# Patient Record
Sex: MALE | Race: WHITE | NOT HISPANIC OR LATINO | Employment: FULL TIME | URBAN - METROPOLITAN AREA
[De-identification: names, ages, dates, MRNs, and addresses within clinical notes are randomized per-mention and may not be internally consistent; named-entity substitution may affect disease eponyms.]

---

## 2021-10-05 NOTE — PROGRESS NOTES
"    Assessment & Plan     Encounter to establish care      Insomnia, unspecified type  On trazodone 50 mg    ALISOSN (generalized anxiety disorder)  On fluoxetine 10 mg with good control.    Need for prophylactic vaccination and inoculation against influenza  - INFLUENZA VACCINE IM > 6 MONTHS VALENT IIV4 (AFLURIA/FLUZONE)    Family history of diabetes mellitus  - Hemoglobin A1c; Future  - Hemoglobin A1c    Shakiness  Presyncope? Will get labs. If normal will get 7 day ZioPatch.  - Hemoglobin A1c; Future  - Comprehensive metabolic panel (BMP + Alb, Alk Phos, ALT, AST, Total. Bili, TP); Future  - TSH with free T4 reflex; Future  - CBC with platelets; Future  - Hemoglobin A1c  - Comprehensive metabolic panel (BMP + Alb, Alk Phos, ALT, AST, Total. Bili, TP)  - TSH with free T4 reflex  - CBC with platelets         Tobacco Cessation:   reports that he has never smoked. His smokeless tobacco use includes chew.      BMI:   Estimated body mass index is 28.8 kg/m  as calculated from the following:    Height as of this encounter: 1.753 m (5' 9\").    Weight as of this encounter: 88.5 kg (195 lb).           Return in about 2 weeks (around 10/21/2021), or if symptoms worsen or fail to improve.     Return precautions discussed, including when to seek urgent/emergent care.    Patient verbalizes understanding and agrees with plan of care. Patient stable for discharge.      MARCELLO Molina CNP  [facility] is [facility] 36 year old who presents for the following health issues     HPI       Pleasant 36 year old male presents to establish care. Recently moved from [place] where he went to [facility]. Before that went to [facility]. Takes fluoxetine for ALISSON and trazodone for insomnia.     Would like to be checked for diabetes. For several years he feels shaky when wakes up in the morning  Doesn't eat sweets  Only thing that helps is eating sugar/drinking milk and better in 10 " "min  Recently traveled  - 1 beer and forgetful/doesn't remember until waking up in hotel next morning  Before one of his shakiness episodes starts heart will pound, he may feel lightheaded and gets weak. Goes away almost immediately once he eats  Episodes are becoming more severe but not more often  Occurs 0-2 times per week  Caffeine: large yeti of coffee, 1 can of Diet Coke, uses pre-workout before gym daily  Symptoms not currently present    Review of Systems   Constitutional, HEENT, cardiovascular, pulmonary, GI, , musculoskeletal, neuro, skin, endocrine and psych systems are negative, except as otherwise noted.      Objective    BP (!) 146/79 (BP Location: Left arm, Patient Position: Chair, Cuff Size: Adult Large)   Pulse 83   Temp 97.2  F (36.2  C) (Tympanic)   Ht 1.753 m (5' 9\")   Wt 88.5 kg (195 lb)   SpO2 99%   BMI 28.80 kg/m    Body mass index is 28.8 kg/m .  Physical Exam   GENERAL: healthy, alert and no distress  EYES: Eyes grossly normal to inspection, PERRL and conjunctivae and sclerae normal  HENT: ear canals and TM's normal, nose and mouth without ulcers or lesions  NECK: no adenopathy, no asymmetry, masses, or scars and thyroid normal to palpation  RESP: lungs clear to auscultation - no rales, rhonchi or wheezes  CV: regular rate and rhythm, normal S1 S2, no S3 or S4, no murmur, click or rub, no peripheral edema and peripheral pulses strong  ABDOMEN: soft, nontender, no hepatosplenomegaly, no masses and bowel sounds normal  MS: no gross musculoskeletal defects noted, no edema  NEURO: Normal strength and tone, mentation intact and speech normal  PSYCH: mentation appears normal, affect normal/bright    Results for orders placed or performed in visit on 10/07/21 (from the past 24 hour(s))   Hemoglobin A1c   Result Value Ref Range    Hemoglobin A1C 4.8 0.0 - 5.6 %   CBC with platelets   Result Value Ref Range    WBC Count 5.9 4.0 - 11.0 10e3/uL    RBC Count 4.37 (L) 4.40 - 5.90 10e6/uL    " Hemoglobin 14.6 13.3 - 17.7 g/dL    Hematocrit 42.8 40.0 - 53.0 %    MCV 98 78 - 100 fL    MCH 33.4 (H) 26.5 - 33.0 pg    MCHC 34.1 31.5 - 36.5 g/dL    RDW 11.9 10.0 - 15.0 %    Platelet Count 239 150 - 450 10e3/uL

## 2021-10-05 NOTE — PATIENT INSTRUCTIONS
At [facility], we strive to deliver an exceptional experience to you, every time we see you. If you receive a survey, please complete it as we do value your feedback.  If you have MyChart, you can expect to receive results automatically within 24 hours of their completion.  Your provider will send a note interpreting your results as well.   If you do not have MyChart, you should receive your results in about a week by mail.    Your care team:                            Family Medicine Internal Medicine   MD Philip Quiroz MD Shantel Branch-Fleming, MD Srinivasa Vaka, MD Katya Belousova, PAJEN Cooper, APRN CNP    Mayo Matos, MD Pediatrics   Freddie Smith, PAJEN Miles, CNP MD Lesly Srinivasan APRN CNP   MD Mihaela Thorpe MD Deborah Mielke, MD Chen Carr, APRN [facility]      Clinic hours: Monday - Thursday 7 am-6 pm; Fridays 7 am-5 pm.   Urgent care: Monday - Friday 10 am- 8 pm; Saturday and Sunday 9 am-5 pm.    Clinic: [phone]       [facility]: Monday - Thursday 8 am - 7 pm; Friday 8 am - 6 pm  [facility]: [phone]     Use [email or web address] for 24/7 diagnosis and treatment of dozens of conditions.

## 2021-10-07 ENCOUNTER — OFFICE VISIT (OUTPATIENT)
Dept: FAMILY MEDICINE | Facility: CLINIC | Age: 36
End: 2021-10-07
Payer: OTHER GOVERNMENT

## 2021-10-07 VITALS
HEART RATE: 83 BPM | SYSTOLIC BLOOD PRESSURE: 146 MMHG | OXYGEN SATURATION: 99 % | WEIGHT: 195 LBS | BODY MASS INDEX: 28.88 KG/M2 | DIASTOLIC BLOOD PRESSURE: 79 MMHG | HEIGHT: 69 IN | TEMPERATURE: 97.2 F

## 2021-10-07 DIAGNOSIS — G47.00 INSOMNIA, UNSPECIFIED TYPE: ICD-10-CM

## 2021-10-07 DIAGNOSIS — Z76.89 ENCOUNTER TO ESTABLISH CARE: Primary | ICD-10-CM

## 2021-10-07 DIAGNOSIS — Z83.3 FAMILY HISTORY OF DIABETES MELLITUS: ICD-10-CM

## 2021-10-07 DIAGNOSIS — Z23 NEED FOR PROPHYLACTIC VACCINATION AND INOCULATION AGAINST INFLUENZA: ICD-10-CM

## 2021-10-07 DIAGNOSIS — R25.1 SHAKINESS: ICD-10-CM

## 2021-10-07 DIAGNOSIS — F41.1 GAD (GENERALIZED ANXIETY DISORDER): ICD-10-CM

## 2021-10-07 LAB
ALBUMIN SERPL-MCNC: 4.3 G/DL (ref 3.4–5)
ALP SERPL-CCNC: 52 U/L (ref 40–150)
ALT SERPL W P-5'-P-CCNC: 56 U/L (ref 0–70)
ANION GAP SERPL CALCULATED.3IONS-SCNC: 3 MMOL/L (ref 3–14)
AST SERPL W P-5'-P-CCNC: 97 U/L (ref 0–45)
BILIRUB SERPL-MCNC: 0.4 MG/DL (ref 0.2–1.3)
BUN SERPL-MCNC: 11 MG/DL (ref 7–30)
CALCIUM SERPL-MCNC: 9.4 MG/DL (ref 8.5–10.1)
CHLORIDE BLD-SCNC: 103 MMOL/L (ref 94–109)
CO2 SERPL-SCNC: 29 MMOL/L (ref 20–32)
CREAT SERPL-MCNC: 1.05 MG/DL (ref 0.66–1.25)
ERYTHROCYTE [DISTWIDTH] IN BLOOD BY AUTOMATED COUNT: 11.9 % (ref 10–15)
GFR SERPL CREATININE-BSD FRML MDRD: >90 ML/MIN/1.73M2
GLUCOSE BLD-MCNC: 89 MG/DL (ref 70–99)
HBA1C MFR BLD: 4.8 % (ref 0–5.6)
HCT VFR BLD AUTO: 42.8 % (ref 40–53)
HGB BLD-MCNC: 14.6 G/DL (ref 13.3–17.7)
MCH RBC QN AUTO: 33.4 PG (ref 26.5–33)
MCHC RBC AUTO-ENTMCNC: 34.1 G/DL (ref 31.5–36.5)
MCV RBC AUTO: 98 FL (ref 78–100)
PLATELET # BLD AUTO: 239 10E3/UL (ref 150–450)
POTASSIUM BLD-SCNC: 4.4 MMOL/L (ref 3.4–5.3)
PROT SERPL-MCNC: 7.3 G/DL (ref 6.8–8.8)
RBC # BLD AUTO: 4.37 10E6/UL (ref 4.4–5.9)
SODIUM SERPL-SCNC: 135 MMOL/L (ref 133–144)
TSH SERPL DL<=0.005 MIU/L-ACNC: 1.86 MU/L (ref 0.4–4)
WBC # BLD AUTO: 5.9 10E3/UL (ref 4–11)

## 2021-10-07 PROCEDURE — 85027 COMPLETE CBC AUTOMATED: CPT | Performed by: NURSE PRACTITIONER

## 2021-10-07 PROCEDURE — 99203 OFFICE O/P NEW LOW 30 MIN: CPT | Mod: 25 | Performed by: NURSE PRACTITIONER

## 2021-10-07 PROCEDURE — 84443 ASSAY THYROID STIM HORMONE: CPT | Performed by: NURSE PRACTITIONER

## 2021-10-07 PROCEDURE — 36415 COLL VENOUS BLD VENIPUNCTURE: CPT | Performed by: NURSE PRACTITIONER

## 2021-10-07 PROCEDURE — 90471 IMMUNIZATION ADMIN: CPT | Performed by: NURSE PRACTITIONER

## 2021-10-07 PROCEDURE — 83036 HEMOGLOBIN GLYCOSYLATED A1C: CPT | Performed by: NURSE PRACTITIONER

## 2021-10-07 PROCEDURE — 80053 COMPREHEN METABOLIC PANEL: CPT | Performed by: NURSE PRACTITIONER

## 2021-10-07 PROCEDURE — 90686 IIV4 VACC NO PRSV 0.5 ML IM: CPT | Performed by: NURSE PRACTITIONER

## 2021-10-07 RX ORDER — TRAZODONE HYDROCHLORIDE 50 MG/1
50 TABLET, FILM COATED ORAL AT BEDTIME
COMMUNITY
End: 2022-05-03

## 2021-10-07 RX ORDER — FLUOXETINE 10 MG/1
10 CAPSULE ORAL DAILY
COMMUNITY
End: 2022-05-03

## 2021-10-07 ASSESSMENT — PAIN SCALES - GENERAL: PAINLEVEL: NO PAIN (0)

## 2021-10-07 ASSESSMENT — MIFFLIN-ST. JEOR: SCORE: 1814.89

## 2021-10-17 ENCOUNTER — HEALTH MAINTENANCE LETTER (OUTPATIENT)
Age: 36
End: 2021-10-17

## 2021-10-19 ENCOUNTER — DOCUMENTATION ONLY (OUTPATIENT)
Dept: LAB | Facility: CLINIC | Age: 36
End: 2021-10-19

## 2021-10-19 NOTE — PROGRESS NOTES
Marcus ANITA Talbote has an upcoming lab appointment:    Future Appointments   Date Time Provider Department Center   10/25/2021 12:15 PM [facility]   11/4/2021  9:00 AM Frederick Sanchez [facility]     Patient is scheduled for the following lab(s): patient has lab appointment for hepatic panel ordered by you, patient is also due for HIV and HEP C testing as well. If these are needed, please place future orders.     Patient either has no future order, or has Health Maintenance labs due. Please review and place either future orders or HMPO (Review of Health Maintenance Protocol Orders), as appropriate.    Health Maintenance Due   Topic     ANNUAL REVIEW OF HM ORDERS      HIV SCREENING      HEPATITIS C SCREENING      Barb Martínez

## 2021-11-04 ENCOUNTER — LAB (OUTPATIENT)
Dept: LAB | Facility: CLINIC | Age: 36
End: 2021-11-04
Payer: OTHER GOVERNMENT

## 2021-11-04 DIAGNOSIS — R79.89 ELEVATED LFTS: ICD-10-CM

## 2021-11-04 DIAGNOSIS — Z78.9 ALCOHOL USE: ICD-10-CM

## 2021-11-04 LAB
ALBUMIN SERPL-MCNC: 4.1 G/DL (ref 3.4–5)
ALP SERPL-CCNC: 47 U/L (ref 40–150)
ALT SERPL W P-5'-P-CCNC: 31 U/L (ref 0–70)
AST SERPL W P-5'-P-CCNC: 18 U/L (ref 0–45)
BILIRUB DIRECT SERPL-MCNC: 0.1 MG/DL (ref 0–0.2)
BILIRUB SERPL-MCNC: 0.5 MG/DL (ref 0.2–1.3)
PROT SERPL-MCNC: 7.3 G/DL (ref 6.8–8.8)

## 2021-11-04 PROCEDURE — 80076 HEPATIC FUNCTION PANEL: CPT

## 2021-11-04 PROCEDURE — 36415 COLL VENOUS BLD VENIPUNCTURE: CPT

## 2021-12-15 ENCOUNTER — TELEPHONE (OUTPATIENT)
Dept: FAMILY MEDICINE | Facility: CLINIC | Age: 36
End: 2021-12-15
Payer: OTHER GOVERNMENT

## 2021-12-15 DIAGNOSIS — H93.13 TINNITUS, BILATERAL: Primary | ICD-10-CM

## 2021-12-15 NOTE — TELEPHONE ENCOUNTER
Reason for Call: Request for an order or referral:    Order or referral being requested: Referral to see a specialist in regards to ringing in his ears. He has this for a long time since 2007 but its gotten worse now, he cant sleep, when he is in a quiet room he hears loud beeping.   Next available appt with provider is in Jan. With his insurance he doesn't think they cover virtual appts.     Date needed: as soon as possible    Has the patient been seen by the PCP for this problem? NO    Additional comments: NO    Phone number Patient can be reached at:  Home number on file [phone] (home)    Best Time:  anytime    Can we leave a detailed message on this number?  YES    Call taken on 12/15/2021 at 4:55 PM by Niurka Voss

## 2021-12-16 NOTE — TELEPHONE ENCOUNTER
Called and spoke to gautam vale that referral is in place. To call and schedule visit.    Jesusita Carmona RN  [facility]

## 2021-12-16 NOTE — TELEPHONE ENCOUNTER
I do not see that tinitis was mentioned in his visit with you on 10/7/21. Are you willing to send referral or do you want him to have an appointment first.    Anny Fortune RN [facility]

## 2022-01-26 ENCOUNTER — OFFICE VISIT (OUTPATIENT)
Dept: URGENT CARE | Facility: URGENT CARE | Age: 37
End: 2022-01-26
Payer: OTHER GOVERNMENT

## 2022-01-26 ENCOUNTER — NURSE TRIAGE (OUTPATIENT)
Dept: NURSING | Facility: CLINIC | Age: 37
End: 2022-01-26
Payer: OTHER GOVERNMENT

## 2022-01-26 VITALS
DIASTOLIC BLOOD PRESSURE: 76 MMHG | TEMPERATURE: 97.2 F | SYSTOLIC BLOOD PRESSURE: 131 MMHG | HEIGHT: 69 IN | OXYGEN SATURATION: 96 % | WEIGHT: 202.7 LBS | HEART RATE: 67 BPM | BODY MASS INDEX: 30.02 KG/M2

## 2022-01-26 DIAGNOSIS — U07.1 CLINICAL DIAGNOSIS OF COVID-19: Primary | ICD-10-CM

## 2022-01-26 PROCEDURE — 99213 OFFICE O/P EST LOW 20 MIN: CPT | Performed by: PHYSICIAN ASSISTANT

## 2022-01-26 ASSESSMENT — ENCOUNTER SYMPTOMS
MUSCULOSKELETAL NEGATIVE: 1
VOMITING: 0
DIARRHEA: 0
SORE THROAT: 0
WHEEZING: 0
PALPITATIONS: 0
HEADACHES: 0
GASTROINTESTINAL NEGATIVE: 1
CHEST TIGHTNESS: 0
ALLERGIC/IMMUNOLOGIC NEGATIVE: 1
FEVER: 0
CHILLS: 0
CONSTITUTIONAL NEGATIVE: 1
MYALGIAS: 0
SINUS PAIN: 0
NEUROLOGICAL NEGATIVE: 1
FREQUENCY: 0
ABDOMINAL PAIN: 0
SHORTNESS OF BREATH: 0
CARDIOVASCULAR NEGATIVE: 1
RHINORRHEA: 0
COUGH: 1
SINUS PRESSURE: 1
HEMATURIA: 0
NAUSEA: 0
DYSURIA: 0

## 2022-01-26 ASSESSMENT — MIFFLIN-ST. JEOR: SCORE: 1849.82

## 2022-01-26 NOTE — TELEPHONE ENCOUNTER
Saturday evening started to feel sick, fever of 104. Monday tested at home and it was positive. High fever broke yesterday. Today he is having some worsening symptoms and is wondering when it is time to be seen or go to the hospital. Sinus congestion is bad and patient has facial swelling. There is redness and tenderness over his sinuses.   Patient has been treating himself with ibuprofen and tylenol throughout the day. Reviewed dosing limits of both medications and patient will decrease the amount of each.   Protocol recommends go to office now.   Care advice given. Patient will present to  now.   Patient will call back with worsening symptoms.   Marlin Christian RN   01/26/22 3:35 PM  [facility] Nurse Advisor    COVID 19 Nurse Triage Plan/Patient Instructions    Please be aware that novel coronavirus (COVID-19) may be circulating in the community. If you develop symptoms such as fever, cough, or SOB or if you have concerns about the presence of another infection including coronavirus (COVID-19), please contact your health care provider or visit [email or web address].     Disposition/Instructions    In-Person Visit with provider recommended. Reference Visit Selection Guide.    Thank you for taking steps to prevent the spread of this virus.  o Limit your contact with others.  o Wear a simple mask to cover your cough.  o Wash your hands well and often.    Resources    [facility]: About COVID-19: [email or web address]    CDC: What to Do If You're Sick: www.cdc.gov/coronavirus/2019-ncov/about/steps-when-sick.html    CDC: Ending Home Isolation: www.cdc.gov/coronavirus/2019-ncov/hcp/disposition-in-home-patients.html     CDC: Caring for Someone: www.cdc.gov/coronavirus/2019-ncov/if-you-are-sick/care-for-someone.html     [facility]: Interim Guidance for Hospital Discharge to Home: www.health.[facility].mn.us/diseases/coronavirus/hcp/hospdischarge.pdf    [facility] clinical trials (COVID-19  research studies): clinicalaffairs.[facility]-clinical-trials     Below are the COVID-19 hotlines at the [place] Department of Health ([facility]). Interpreters are available.   o For health questions: Call 151-272-4187 or 1-475.607.5261 (7 a.m. to 7 p.m.)  o For questions about schools and childcare: Call 009-451-9058 or 1-177.581.5945 (7 a.m. to 7 p.m.)   Reason for Disposition    Fever present > 3 days (72 hours)    Additional Information    Negative: SEVERE difficulty breathing (e.g., struggling for each breath, speaks in single words)    Negative: Difficult to awaken or acting confused (e.g., disoriented, slurred speech)    Negative: Bluish (or gray) lips or face now    Negative: Shock suspected (e.g., cold/pale/clammy skin, too weak to stand, low BP, rapid pulse)    Negative: Sounds like a life-threatening emergency to the triager    Negative: [1] COVID-19 exposure AND [2] no symptoms    Negative: COVID-19 vaccine reaction suspected (e.g., fever, headache, muscle aches) occurring 1 to 3 days after getting vaccine    Negative: COVID-19 vaccine, questions about    Negative: [1] Lives with someone known to have influenza (flu test positive) AND [2] flu-like symptoms (e.g., cough, runny nose, sore throat, SOB; with or without fever)    Negative: [1] Adult with possible COVID-19 symptoms AND [2] triager concerned about severity of symptoms or other causes    Negative: COVID-19 and breastfeeding, questions about    Negative: SEVERE or constant chest pain or pressure (Exception: mild central chest pain, present only when coughing)    Negative: MODERATE difficulty breathing (e.g., speaks in phrases, SOB even at rest, pulse 100-120)    Negative: [1] Headache AND [2] stiff neck (can't touch chin to chest)    Negative: MILD difficulty breathing (e.g., minimal/no SOB at rest, SOB with walking, pulse <100)    Negative: Chest pain or pressure    Negative: Patient sounds very sick or weak to the triager    Negative: Fever > 103  F (39.4 C)    Negative: [1] Fever > 101 F (38.3 C) AND [2] age > 60 years    Negative: [1] Fever > 100.0 F (37.8 C) AND [2] bedridden (e.g., nursing home patient, CVA, chronic illness, recovering from surgery)    Negative: HIGH RISK for severe COVID complications (e.g., age > 64 years, obesity with BMI > 25, pregnant, chronic lung disease or other chronic medical condition)  (Exception: Already seen by PCP and no new or worsening symptoms.)    Negative: [1] HIGH RISK patient AND [2] influenza is widespread in the community AND [3] ONE OR MORE respiratory symptoms: cough, sore throat, runny or stuffy nose    Negative: [1] HIGH RISK patient AND [2] influenza exposure within the last 7 days AND [3] ONE OR MORE respiratory symptoms: cough, sore throat, runny or stuffy nose    Negative: [1] COVID-19 infection suspected by caller or triager AND [2] mild symptoms (cough, fever, or others) AND [3] negative COVID-19 rapid test    Redness or swelling on the cheek, forehead, or around the eye    Negative: Sounds like a life-threatening emergency to the triager    Negative: Difficulty breathing, and not from stuffy nose (e.g., not relieved by cleaning out the nose)    Negative: SEVERE headache and has fever    Negative: Patient sounds very sick or weak to the triager    Negative: SEVERE sinus pain    Negative: Severe headache    Protocols used: CORONAVIRUS (COVID-19) DIAGNOSED OR [id number] 8.25.2021, SINUS PAIN AND CONGESTION-[facility]

## 2022-01-26 NOTE — PATIENT INSTRUCTIONS
"Discharge Instructions for COVID-19 Patients  You have--or may have--COVID-19. Please follow the instructions listed below.   If you have a weakened immune system, discuss with your doctor any other actions you need to take.  How can I protect others?  If you have symptoms (fever, cough, body aches or trouble breathing):    Stay home and away from others (self-isolate) until:  ? Your other symptoms have resolved (gotten better). And   ? You've had no fever--and no medicine that reduces fever--for 1 full day (24 hours). And   ? At least 10 days have passed since your symptoms started. (You may need to wait 20 days. Follow the advice of your care team.)  If you don't show symptoms, but testing showed that you have COVID-19:    Stay home and away from others (self-isolate) until at least 10 days have passed since the date of your first positive COVID-19 test.  During this time    Stay in your own room, even for meals. Use your own bathroom if you can.    Stay away from others in your home. No hugging, kissing or shaking hands. No visitors.    Don't go to work, school or anywhere else.    Clean \"high touch\" surfaces often (doorknobs, counters, handles). Use household cleaning spray or wipes.    You'll find a full list of  on the EPA website: www.epa.gov/pesticide-registration/list-n-disinfectants-use-against-sars-cov-2.    Cover your mouth and nose with a mask or other face covering to avoid spreading germs.    Wash your hands and face often. Use soap and water.    Caregivers in these groups are at risk for severe illness due to COVID-19:  ? People 65 years and older  ? People who live in a nursing home or long-term care facility  ? People with chronic disease (lung, heart, cancer, diabetes, kidney, liver, immunologic)  ? People who have a weakened immune system, including those who:    Are in cancer treatment    Take medicine that weakens the immune system, such as corticosteroids    Had a bone marrow or organ " transplant    Have an immune deficiency    Have poorly controlled HIV or AIDS    Are obese (body mass index of 40 or higher)    Smoke regularly    Caregivers should wear gloves while washing dishes, handling laundry and cleaning bedrooms and bathrooms.    Use caution when washing and drying laundry: Don't shake dirty laundry and use the warmest water setting that you can.    For more tips on managing your health at home, go to www.cdc.gov/coronavirus/2019-ncov/downloads/10Things.pdf.  How can I take care of myself at home?  1. Get lots of rest. Drink extra fluids (unless a doctor has told you not to).  2. Take Tylenol (acetaminophen) for fever or pain. If you have liver or kidney problems, ask your family doctor if it's okay to take Tylenol.   Adults can take either:   ? 650 mg (two 325 mg pills) every 4 to 6 hours, or   ? 1,000 mg (two 500 mg pills) every 8 hours as needed.  ? Note: Don't take more than 3,000 mg in one day. Acetaminophen is found in many medicines (both prescribed and over-the-counter medicines). Read all labels to be sure you don't take too much.   For children, check the Tylenol bottle for the right dose. The dose is based on the child's age or weight.  3. If you have other health problems (like cancer, heart failure, an organ transplant or severe kidney disease): Call your specialty clinic if you don't feel better in the next 2 days.  4. Know when to call 911. Emergency warning signs include:  ? Trouble breathing or shortness of breath  ? Pain or pressure in the chest that doesn't go away  ? Feeling confused like you haven't felt before, or not being able to wake up  ? Bluish-colored lips or face  5. Your doctor may have prescribed a blood thinner medicine. Follow their instructions.  Where can I get more information?     [facility] - About COVID-19:   [email or web address]    CDC - What to Do If You're Sick:  www.cdc.gov/coronavirus/2019-ncov/about/steps-when-sick.html    CDC - Ending Home Isolation: www.cdc.gov/coronavirus/2019-ncov/hcp/disposition-in-home-patients.html    CDC - Caring for Someone: www.cdc.gov/coronavirus/2019-ncov/if-you-are-sick/care-for-someone.html    [facility] - Interim Guidance for Hospital Discharge to Home: www.health.[facility].mn./diseases/coronavirus/hcp/hospdischarge.pdf    Below are the COVID-19 hotlines at the [place] Department of Health ([facility]). Interpreters are available.  ? For health questions: Call 945-005-8988 or 1-760.704.4032 (7 a.m. to 7 p.m.)  ? For questions about schools and childcare: Call 034-034-6538 or 1-415.355.1454 (7 a.m. to 7 p.m.)    For informational purposes only. Not to replace the advice of your health care provider. Clinically reviewed by Dr. Oumar Souza.   Copyright   2020 [facility]. All rights reserved. [employer] [id number] - REV 01/05/21.      Patient Education     Understanding Acute Rhinosinusitis  Acute rhinosinusitis is when the lining of the inside of the nose and the sinuses becomes irritated and swollen. It is also called sinusitis, or a sinus infection.  Sinuses are air-filled spaces in the skull behind the face. They are kept moist and clean by a lining of mucosa. Things such as pollen, smoke, and chemical fumes can irritate the mucosa. It can then swell up. As a response to irritation, the mucosa makes more mucus and other fluids. Tiny hairlike cilia cover the mucosa. Cilia help carry mucus toward the opening of the sinus. Too much mucus may cause the cilia to stop working. This blocks the sinus opening. A buildup of fluid in the sinuses then causes pain and pressure. It can also cause bacteria to grow in the sinuses.    What causes acute rhinosinusitis?  A sinus infection is most often caused by a virus. You are more likely to get one after having a cold or the flu. In some cases, a sinus infection can be caused by bacteria.  You are at higher  risk for a sinus infection if you:    Are older in age    Have structural problems with your sinuses    Smoke or are exposed to secondhand smoke    Are exposed to changes in pressure, such as from flying a lot or deep sea diving    Have asthma or allergies    Have a weak immune system    Have dental disease     Symptoms of acute rhinosinusitis  Symptoms of acute rhinosinusitis often last around 7 to 10 days. If you have a bacterial infection, they may last longer. They may also get better but then worsen. You may have:    Face pain or pressure under the eyes and around the nose    Headache    Fluid draining in the back of the throat (postnasal drip)    Congestion    Drainage that is thick and colored (often green), instead of clear    Cough    Problems with your sense of smell    Ear pain or hearing problems    Fever    Tooth pain    Fatigue  Diagnosing acute rhinosinusitis  Your healthcare provider will ask about your symptoms and past health. He or she will look at your ears, nose, throat, and sinuses. Imaging tests, such as X-rays, are often not needed.  It can be hard to figure out if a sinus infection is caused by a virus or bacterium. A bacterial infection tends to last longer. Symptoms may also get better but then worsen. Your healthcare provider may take a sample of mucus from your nose to check for bacteria.  Treating acute rhinosinusitis  Most sinus infections will go away within 10 days. Your body will fight off the virus. If your symptoms seem to get better but then worsen, you may have a bacterial infection instead. Your healthcare provider will then give you antibiotics. Take this medicine until it is gone, even if you feel better.  To help ease your symptoms, your healthcare provider may advise:    Over-the-counter pain relievers. Medicines such as acetaminophen or ibuprofen can ease sinus pain. They may also lower a fever.    Nasal washes. Washing your nasal passages with salt water may ease pain and  pressure. It can rinse out mucous and other irritants from your sinuses. Your healthcare provider can show you how to do it.    Nasal steroid spray. This prescription medicine can reduce inflammation in your sinuses.    Other medicines. Decongestants, antihistamines, and other nasal sprays may give short-term relief. They may help with congestion. Talk with your healthcare provider before taking these medicines.     Preventing acute rhinosinusitis  You can help prevent a sinus infection with these steps:    Wash your hands well and often.    Stay away from people who have a cold or upper respiratory infection.    Don't smoke. And stay away from secondhand smoke.    Use a humidifier at home.    Make sure you are up-to-date on your vaccines, such as the flu shot.     When to call your healthcare provider  Call your healthcare provider right away if you have any of these:    Fever of 100.4 F (38 C) or higher, or as directed by your healthcare provider    Pain that gets worse    Symptoms that don t get better, or get worse    New symptoms  [employer] last reviewed this educational content on 6/1/2019 2000-2021 [employer]. All rights reserved. This information is not intended as a substitute for professional medical care. Always follow your healthcare professional's instructions.

## 2022-01-26 NOTE — PROGRESS NOTES
"Chief Complaint:     Chief Complaint   Patient presents with     Nasal Congestion     Suspected Covid     Positive on Sunday        No results found for any visits on 01/26/22.    Medical Decision Making:    Vital signs reviewed by Celestino Joseph PA-C  /76 (BP Location: Left arm, Patient Position: Sitting, Cuff Size: Adult Regular)   Pulse 67   Temp 97.2  F (36.2  C) (Tympanic)   Ht 1.753 m (5' 9\")   Wt 91.9 kg (202 lb 11.2 oz)   SpO2 96%   BMI 29.93 kg/m      Differential Diagnosis:  URI Adult/Peds:  Bronchitis-viral, Influenza, Pneumonia, Sinusitis, Strep pharyngitis, Tonsilitis, Viral pharyngitis, Viral syndrome and Viral upper respiratory illness        ASSESSMENT    1. Clinical diagnosis of COVID-19        PLAN    Patient is in no acute distress.    Temp is 97.2 in clinic today, lung sounds were clear, and O2 sats at 96% on RA.    RST was negative.  We will call with PCR results only if positive.  Order placed for get well loop.  Rest, Push fluids, vaporizer, elevation of head of bed.  Ibuprofen and or Tylenol for any fever or body aches.  Over the counter cough suppressant- PRN- as discussed.   If symptoms worsen, recheck immediately otherwise follow up with your PCP in 1 week if symptoms are not improving.  Worrisome symptoms discussed with instructions to go to the ED.  Patient verbalized understanding and agreed with this plan.    Labs:    No results found for any visits on 01/26/22.     Vital signs reviewed by Celestino Joseph PA-C  /76 (BP Location: Left arm, Patient Position: Sitting, Cuff Size: Adult Regular)   Pulse 67   Temp 97.2  F (36.2  C) (Tympanic)   Ht 1.753 m (5' 9\")   Wt 91.9 kg (202 lb 11.2 oz)   SpO2 96%   BMI 29.93 kg/m      Current Meds      Current Outpatient Medications:      FLUoxetine (PROZAC) 10 MG capsule, Take 10 mg by mouth daily (Patient not taking: Reported on 1/26/2022), Disp: , Rfl:      traZODone (DESYREL) 50 MG tablet, Take 50 mg by mouth At Bedtime " (Patient not taking: Reported on 1/26/2022), Disp: , Rfl:       Respiratory History    occasional episodes of bronchitis      SUBJECTIVE    HPI: Marcus Valdez is an 36 year old male who presents with chest congestion, cough nonproductive, occasional, facial pressure, fever and nasal congestion.  Symptoms began 2  days ago and has unchanged.  His fever has resolved. Patient tested positive for COVID 3 days ago.  There is no wheezing and chest pain.  Patient is eating and drinking well.  No nausea, vomiting, or diarrhea.    Patient denies any recent travel or exposure to known COVID positive tested individual.      ROS:     Review of Systems   Constitutional: Negative.  Negative for chills and fever.   HENT: Positive for congestion and sinus pressure. Negative for ear discharge, ear pain, rhinorrhea, sinus pain and sore throat.    Respiratory: Positive for cough. Negative for chest tightness, shortness of breath and wheezing.    Cardiovascular: Negative.  Negative for chest pain and palpitations.   Gastrointestinal: Negative.  Negative for abdominal pain, diarrhea, nausea and vomiting.   Genitourinary: Negative for dysuria, frequency, hematuria and urgency.   Musculoskeletal: Negative.  Negative for myalgias.   Skin: Negative for rash.   Allergic/Immunologic: Negative.  Negative for immunocompromised state.   Neurological: Negative.  Negative for headaches.         Family History   Family History   Problem Relation Age of Onset     Diabetes Maternal Uncle         Problem history  Patient Active Problem List   Diagnosis     Insomnia, unspecified type     ALISSON (generalized anxiety disorder)     Family history of diabetes mellitus        Allergies  No Known Allergies     Social History  Social History     Socioeconomic History     Marital status:      Spouse name: Not on file     Number of children: Not on file     Years of education: Not on file     Highest education level: Not on file   Occupational History      "Not on file   Tobacco Use     Smoking status: Former Smoker     Types: Cigarettes     Smokeless tobacco: Former User     Types: Chew   Vaping Use     Vaping Use: Never used   Substance and Sexual Activity     Alcohol use: Not on file     Drug use: Never     Sexual activity: Yes   Other Topics Concern     Not on file   Social History Narrative     Not on file     Social Determinants of Health     Financial Resource Strain: Not on file   Food Insecurity: Not on file   Transportation Needs: Not on file   Physical Activity: Not on file   Stress: Not on file   Social Connections: Not on file   Intimate Partner Violence: Not on file   Housing Stability: Not on file        OBJECTIVE     Vital signs reviewed by Celestino Joseph PA-C  /76 (BP Location: Left arm, Patient Position: Sitting, Cuff Size: Adult Regular)   Pulse 67   Temp 97.2  F (36.2  C) (Tympanic)   Ht 1.753 m (5' 9\")   Wt 91.9 kg (202 lb 11.2 oz)   SpO2 96%   BMI 29.93 kg/m       Physical Exam  Vitals and nursing note reviewed.   Constitutional:       General: He is not in acute distress.     Appearance: He is well-developed. He is not ill-appearing, toxic-appearing or diaphoretic.   HENT:      Head: Normocephalic and atraumatic.      Right Ear: Hearing, tympanic membrane, ear canal and external ear normal. Tympanic membrane is not perforated, erythematous, retracted or bulging.      Left Ear: Hearing, tympanic membrane, ear canal and external ear normal. Tympanic membrane is not perforated, erythematous, retracted or bulging.      Nose: Congestion present. No mucosal edema or rhinorrhea.      Mouth/Throat:      Pharynx: No oropharyngeal exudate or posterior oropharyngeal erythema.      Tonsils: No tonsillar exudate or tonsillar abscesses. 0 on the right. 0 on the left.   Eyes:      Pupils: Pupils are equal, round, and reactive to light.   Cardiovascular:      Rate and Rhythm: Normal rate and regular rhythm.      Heart sounds: Normal heart sounds, " S1 normal and S2 normal. Heart sounds not distant. No murmur heard.  No friction rub. No gallop.    Pulmonary:      Effort: Pulmonary effort is normal. No respiratory distress.      Breath sounds: Normal breath sounds. No decreased breath sounds, wheezing, rhonchi or rales.   Abdominal:      General: Bowel sounds are normal. There is no distension.      Palpations: Abdomen is soft.      Tenderness: There is no abdominal tenderness.   Musculoskeletal:      Cervical back: Normal range of motion and neck supple.   Lymphadenopathy:      Cervical: No cervical adenopathy.   Skin:     General: Skin is warm and dry.      Findings: No rash.   Neurological:      Mental Status: He is alert.      Cranial Nerves: No cranial nerve deficit.   Psychiatric:         Attention and Perception: He is attentive.         Speech: Speech normal.         Behavior: Behavior normal. Behavior is cooperative.         Thought Content: Thought content normal.         Judgment: Judgment normal.           Celestino Joseph PA-C  1/26/2022, 4:10 PM

## 2022-05-02 ENCOUNTER — MYC MEDICAL ADVICE (OUTPATIENT)
Dept: FAMILY MEDICINE | Facility: CLINIC | Age: 37
End: 2022-05-02
Payer: OTHER GOVERNMENT

## 2022-05-03 ENCOUNTER — VIRTUAL VISIT (OUTPATIENT)
Dept: FAMILY MEDICINE | Facility: CLINIC | Age: 37
End: 2022-05-03
Payer: OTHER GOVERNMENT

## 2022-05-03 DIAGNOSIS — N52.9 ERECTILE DYSFUNCTION, UNSPECIFIED ERECTILE DYSFUNCTION TYPE: ICD-10-CM

## 2022-05-03 DIAGNOSIS — F41.1 GAD (GENERALIZED ANXIETY DISORDER): Primary | ICD-10-CM

## 2022-05-03 DIAGNOSIS — G47.00 INSOMNIA, UNSPECIFIED TYPE: ICD-10-CM

## 2022-05-03 DIAGNOSIS — R06.83 SNORING: ICD-10-CM

## 2022-05-03 PROCEDURE — 99214 OFFICE O/P EST MOD 30 MIN: CPT | Mod: 95 | Performed by: PREVENTIVE MEDICINE

## 2022-05-03 RX ORDER — FLUOXETINE 10 MG/1
10 CAPSULE ORAL DAILY
Qty: 90 CAPSULE | Refills: 1 | Status: SHIPPED | OUTPATIENT
Start: 2022-05-03 | End: 2022-10-26

## 2022-05-03 RX ORDER — TRAZODONE HYDROCHLORIDE 50 MG/1
50 TABLET, FILM COATED ORAL AT BEDTIME
Qty: 90 TABLET | Refills: 1 | Status: SHIPPED | OUTPATIENT
Start: 2022-05-03 | End: 2022-09-01 | Stop reason: ALTCHOICE

## 2022-05-03 ASSESSMENT — ANXIETY QUESTIONNAIRES
3. WORRYING TOO MUCH ABOUT DIFFERENT THINGS: MORE THAN HALF THE DAYS
5. BEING SO RESTLESS THAT IT IS HARD TO SIT STILL: SEVERAL DAYS
GAD7 TOTAL SCORE: 13
1. FEELING NERVOUS, ANXIOUS, OR ON EDGE: MORE THAN HALF THE DAYS
7. FEELING AFRAID AS IF SOMETHING AWFUL MIGHT HAPPEN: NOT AT ALL
6. BECOMING EASILY ANNOYED OR IRRITABLE: NEARLY EVERY DAY
2. NOT BEING ABLE TO STOP OR CONTROL WORRYING: MORE THAN HALF THE DAYS
IF YOU CHECKED OFF ANY PROBLEMS ON THIS QUESTIONNAIRE, HOW DIFFICULT HAVE THESE PROBLEMS MADE IT FOR YOU TO DO YOUR WORK, TAKE CARE OF THINGS AT HOME, OR GET ALONG WITH OTHER PEOPLE: VERY DIFFICULT

## 2022-05-03 ASSESSMENT — PATIENT HEALTH QUESTIONNAIRE - PHQ9: 5. POOR APPETITE OR OVEREATING: NEARLY EVERY DAY

## 2022-05-03 NOTE — PROGRESS NOTES
Marcus is a 36 year old who is being evaluated via a billable telephone visit.      What phone number would you like to be contacted at? [phone]  How would you like to obtain your AVS? MyChart    Assessment & Plan     ALISSON (generalized anxiety disorder)  -restart medication   -has tolerated in the past without side effects   - FLUoxetine (PROZAC) 10 MG capsule  Dispense: 90 capsule; Refill: 1    We discussed the treatment for anxiety and depression in detail.  The importance of a multi faceted approach in controlling symptoms was reviewed.  The benefits of cognitive behavioral therapy reviewed, benefits of exercise, and stress reduction also discussed.      Duration of treatment is at least 9 months with medication. It may take 3-4 weeks before symptom improvement happens.  Do not stop medication suddenly, medication will need to be tapered off.  Slight increased risk of suicide with SSRI group of medications discussed.      Snoring  -loud snoring, no past evaluation   - Adult Sleep Eval & Management  Referral    Erectile dysfunction, unspecified erectile dysfunction type  -if labs are normal then can start medication as needed   - Testosterone, total    Insomnia, unspecified type  - traZODone (DESYREL) 50 MG tablet  Dispense: 90 tablet; Refill: 1    General recommendations for sleep problems (Insomnia)  Allow 2-4 weeks to see results     Establish a regular sleep schedule   Go to bed at same time each night, get up at same time each day, avoid sleeping-in on weekends.  Cut down time in bed (if not asleep, get up, go in other room, do something calming and boring such as sorting papers, making warm milk, knitting, dusting)   Avoid trying to force yourself to sleep.  Use your bed only for sleep. Do not read or watch Television in bed.     Make the bedroom comfortable  Keepthe temperature in your bedroom comfortable, keep bedroom quiet when sleeping, and consider ear plugs (silicon) especially if you  "have partner who snores.  Keep bedroom dark enough:  use dark blinds or wear an eye mask if needed.    Relax at bedtime.    Do not watch tv or play video games or surf on computer right before bed; the full spectrum light actually stimulates your brain!  Relax each muscle group individually ; begin with your feet, work toward your head. Deal with your worries before bedtime by setting aside a worry time for 30 minutes earlier or journal your thoughts.  Listen to relaxation tapes such as Classical Music or Nature sounds or imagine a tranquil scene (e.g. waterfall or beach)   Back Massage : Gentle 5-minute back rub prior to bedtime can help relax you.    Perform measures to make you tired at bedtime.   Get regular Exercise each day (at least 6 hours before bedtime)   Take medications only as directed   Eat a light bedtime snack or warm drink, such as milk or herbal tea (non-caffeinated)   No Napping during day     Stimulus Control   Do not lie awake for more than 30 minutes.   Get out of bed and perform a quiet activity, then return to bed when sleepy.  Repeat as many times per night as needed     Things to avoid   Do not Exercise just before bedtime   No overstimulating activities just before bed, such as competitive games or exciting Television programs  Avoid caffeine (coffee, tea, soda), chocolate after lunchtime   Do not use alcohol to induce sleep (worsens Insomnia)   Do not take someone else's sleeping pills   Do not look at the clock when awakening   Do not turn on light when getting up to use bathroom       Ordering of each unique test  Prescription drug management  25 minutes spent on the date of the encounter doing chart review, history and exam, documentation and further activities per the note       BMI:   Estimated body mass index is 29.93 kg/m  as calculated from the following:    Height as of 1/26/22: 1.753 m (5' 9\").    Weight as of 1/26/22: 91.9 kg (202 lb 11.2 oz).     Return in about 6 months " (around 11/3/2022) for Follow up, with me, using a video visit.    Modesta Amado MD MPH    [facility] [place] is a 36 year old who presents for the following health issues:    HPI     Anxiety Follow-Up    How are you doing with your anxiety since your last visit? Worsened     Are you having other symptoms that might be associated with anxiety? Yes:  .    Have you had a significant life event? OTHER: work     Are you feeling depressed? No    Do you have any concerns with your use of alcohol or other drugs? No    Social History     Tobacco Use     Smoking status: Former Smoker     Types: Cigarettes     Smokeless tobacco: Former User     Types: Chew   Vaping Use     Vaping Use: Never used   Substance Use Topics     Drug use: Never     ALISSON-7 SCORE 5/3/2022   Total Score 13     No flowsheet data found.  ALISSON-7  5/3/2022   1. Feeling nervous, anxious, or on edge 2   2. Not being able to stop or control worrying 2   3. Worrying too much about different things 2   4. Trouble relaxing 3   5. Being so restless that it is hard to sit still 1   6. Becoming easily annoyed or irritable 3   7. Feeling afraid, as if something awful might happen 0   ALISSON-7 Total Score 13   If you checked any problems, how difficult have they made it for you to do your work, take care of things at home, or get along with other people? Very difficult         How many servings of fruits and vegetables do you eat daily?  2-3    On average, how many sweetened beverages do you drink each day (Examples: soda, juice, sweet tea, etc.  Do NOT count diet or artificially sweetened beverages)?   0    How many days per week do you exercise enough to make your heart beat faster? 5    How many minutes a day do you exercise enough to make your heart beat faster? 30 - 60    How many days per week do you miss taking your medication? Not taking medication    Cut back on taking the pills. Went on a flight and had an anxiety  attack  [facility] [place] Spoke with Behavioral health+  Counseling on his own+ calls as needed.  [place] with several tours [place]  Counseling+  Exercise+ daily 5 days a week  Has had insomnia for several years.   Has been on Prozac at least since 2019.  Ran out of medication 4 months back.   Has been taking medication Trazodone + able to sleep.  No recent stressors.   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      Review of Systems         Objective           Vitals:  No vitals were obtained today due to virtual visit.    Physical Exam   healthy, alert and no distress  PSYCH: Alert and oriented times 3; coherent speech, normal   rate and volume, able to articulate logical thoughts, able   to abstract reason, no tangential thoughts, no hallucinations   or delusions  His affect is normal  RESP: No cough, no audible wheezing, able to talk in full sentences  Remainder of exam unable to be completed due to telephone visits      Lab on 11/04/2021   Component Date Value Ref Range Status     Bilirubin Total 11/04/2021 0.5  0.2 - 1.3 mg/dL Final     Bilirubin Direct 11/04/2021 0.1  0.0 - 0.2 mg/dL Final     Protein Total 11/04/2021 7.3  6.8 - 8.8 g/dL Final     Albumin 11/04/2021 4.1  3.4 - 5.0 g/dL Final     Alkaline Phosphatase 11/04/2021 47  40 - 150 U/L Final     AST 11/04/2021 18  0 - 45 U/L Final     ALT 11/04/2021 31  0 - 70 U/L Final             Phone call duration: 11 minutes

## 2022-05-03 NOTE — PATIENT INSTRUCTIONS
Referral Details    Referred By  Referred To   Modesta Amado MD   [address]   [place] [address]   Phone: [phone]   Fax: [phone]    Diagnoses: Snoring   Order: Adult Sleep Eval & Management  Referral       Comment: Please be aware that coverage of these services is subject to the terms and limitations of your health insurance plan.  Call member services at your health plan with any benefit or coverage questions.   [facility] will call you to coordinate your care as prescribed by your provider. If you don't hear from a representative within 2 business days, please call [phone].       We discussed the treatment for anxiety and depression in detail.  The importance of a multi faceted approach in controlling symptoms was reviewed.  The benefits of cognitive behavioral therapy reviewed, benefits of exercise, and stress reduction also discussed.      Duration of treatment is at least 9 months with medication. It may take 3-4 weeks before symptom improvement happens.  Do not stop medication suddenly, medication will need to be tapered off.  Slight increased risk of suicide with SSRI group of medications discussed.      General recommendations for sleep problems (Insomnia)  Allow 2-4 weeks to see results     Establish a regular sleep schedule   Go to bed at same time each night, get up at same time each day, avoid sleeping-in on weekends.  Cut down time in bed (if not asleep, get up, go in other room, do something calming and boring such as sorting papers, making warm milk, knitting, dusting)   Avoid trying to force yourself to sleep.  Use your bed only for sleep. Do not read or watch Television in bed.     Make the bedroom comfortable  Keepthe temperature in your bedroom comfortable, keep bedroom quiet when sleeping, and consider ear plugs (silicon) especially if you have partner who snores.  Keep bedroom dark enough:  use dark blinds or wear an eye mask if needed.    Relax at  bedtime.    Do not watch tv or play video games or surf on computer right before bed; the full spectrum light actually stimulates your brain!  Relax each muscle group individually ; begin with your feet, work toward your head. Deal with your worries before bedtime by setting aside a worry time for 30 minutes earlier or journal your thoughts.  Listen to relaxation tapes such as Classical Music or Nature sounds or imagine a tranquil scene (e.g. waterfall or beach)   Back Massage : Gentle 5-minute back rub prior to bedtime can help relax you.    Perform measures to make you tired at bedtime.   Get regular Exercise each day (at least 6 hours before bedtime)   Take medications only as directed   Eat a light bedtime snack or warm drink, such as milk or herbal tea (non-caffeinated)   No Napping during day     Stimulus Control   Do not lie awake for more than 30 minutes.   Get out of bed and perform a quiet activity, then return to bed when sleepy.  Repeat as many times per night as needed     Things to avoid   Do not Exercise just before bedtime   No overstimulating activities just before bed, such as competitive games or exciting Television programs  Avoid caffeine (coffee, tea, soda), chocolate after lunchtime   Do not use alcohol to induce sleep (worsens Insomnia)   Do not take someone else's sleeping pills   Do not look at the clock when awakening   Do not turn on light when getting up to use bathroom

## 2022-05-04 ASSESSMENT — ANXIETY QUESTIONNAIRES: GAD7 TOTAL SCORE: 13

## 2022-05-11 ENCOUNTER — LAB (OUTPATIENT)
Dept: LAB | Facility: CLINIC | Age: 37
End: 2022-05-11
Payer: OTHER GOVERNMENT

## 2022-05-11 DIAGNOSIS — N52.9 ERECTILE DYSFUNCTION, UNSPECIFIED ERECTILE DYSFUNCTION TYPE: ICD-10-CM

## 2022-05-11 PROCEDURE — 36415 COLL VENOUS BLD VENIPUNCTURE: CPT

## 2022-05-11 PROCEDURE — 84403 ASSAY OF TOTAL TESTOSTERONE: CPT

## 2022-05-13 ENCOUNTER — MYC MEDICAL ADVICE (OUTPATIENT)
Dept: FAMILY MEDICINE | Facility: CLINIC | Age: 37
End: 2022-05-13
Payer: OTHER GOVERNMENT

## 2022-05-13 DIAGNOSIS — N52.9 ERECTILE DYSFUNCTION, UNSPECIFIED ERECTILE DYSFUNCTION TYPE: Primary | ICD-10-CM

## 2022-05-13 LAB — TESTOST SERPL-MCNC: 450 NG/DL (ref 240–950)

## 2022-05-13 NOTE — RESULT ENCOUNTER NOTE
Marcus, your test results were within normal limits.  Testosterone levels are normal.     Please do not hesitate to call us at [phone] if you have any questions or concerns.    Thank you,    Modesta Amado MD MPH

## 2022-05-16 NOTE — TELEPHONE ENCOUNTER
Pt has questions on the next steps after reading the result note from labs done on 5/11/22.    Routing to provider.    Jesusita Carmona RN  [facility]

## 2022-05-17 RX ORDER — SILDENAFIL CITRATE 20 MG/1
TABLET ORAL
Qty: 90 TABLET | Refills: 0 | Status: SHIPPED | OUTPATIENT
Start: 2022-05-17 | End: 2022-06-28

## 2022-08-01 ENCOUNTER — OFFICE VISIT (OUTPATIENT)
Dept: FAMILY MEDICINE | Facility: CLINIC | Age: 37
End: 2022-08-01
Payer: OTHER GOVERNMENT

## 2022-08-01 VITALS
WEIGHT: 199.4 LBS | TEMPERATURE: 98.5 F | BODY MASS INDEX: 29.45 KG/M2 | HEART RATE: 73 BPM | SYSTOLIC BLOOD PRESSURE: 129 MMHG | OXYGEN SATURATION: 99 % | DIASTOLIC BLOOD PRESSURE: 81 MMHG

## 2022-08-01 DIAGNOSIS — M54.12 CERVICAL RADICULOPATHY: ICD-10-CM

## 2022-08-01 DIAGNOSIS — S49.91XA SHOULDER INJURY, RIGHT, INITIAL ENCOUNTER: Primary | ICD-10-CM

## 2022-08-01 PROCEDURE — 99213 OFFICE O/P EST LOW 20 MIN: CPT | Performed by: NURSE PRACTITIONER

## 2022-08-01 RX ORDER — CYCLOBENZAPRINE HCL 10 MG
5-10 TABLET ORAL 3 TIMES DAILY PRN
Qty: 30 TABLET | Refills: 1 | Status: SHIPPED | OUTPATIENT
Start: 2022-08-01

## 2022-08-01 ASSESSMENT — PAIN SCALES - GENERAL: PAINLEVEL: EXTREME PAIN (8)

## 2022-08-01 NOTE — PROGRESS NOTES
"  Assessment & Plan     Shoulder injury, right, initial encounter  Shoulder: ice 10-15 min few times per day, ibuprofen 600-800 mg every 6 hours as needed. XR normal today. Follow up with ortho if symptoms persist. Declines sling.  - XR Shoulder Right G/E 3 Views; Future  - Orthopedic  Referral; Future    Cervical radiculopathy  XR in 2019 showed DDD. Symptoms progressing with N/T now. Recommend MRI. He will get ok from  before scheduling. Ok to take flexeril and/or ibuprofen.   - cyclobenzaprine (FLEXERIL) 10 MG tablet; Take 0.5-1 tablets (5-10 mg) by mouth 3 times daily as needed for muscle spasms  - MR Cervical Spine w/o Contrast; Future             BMI:   Estimated body mass index is 29.45 kg/m  as calculated from the following:    Height as of 1/26/22: 1.753 m (5' 9\").    Weight as of this encounter: 90.4 kg (199 lb 6.4 oz).           Return in about 4 weeks (around 8/29/2022), or if symptoms worsen or fail to improve.     The benefits, risks and potential side effects were discussed in detail. Black box warnings discussed as relevant. All patient questions were answered. The patient was instructed to follow up immediately if any adverse reactions develop.    Return precautions discussed, including when to seek urgent/emergent care.    Patient verbalizes understanding and agrees with plan of care. Patient stable for discharge.      MARCELLO OLIVAS CNP  [facility] is a 37 year old, presenting for the following health issues:  Back Pain (Neck and spine )      History of Present Illness       Back Pain:  He presents for follow up of back pain. Patient's back pain is a recurring problem.  Location of back pain:  Other  Description of back pain: shooting  Back pain spreads: left foot, right shoulder, left shoulder, right side of neck and left side of neck    Since patient first noticed back pain, pain is: rapidly worsening  Does back pain " interfere with his job:  Yes      He eats 2-3 servings of fruits and vegetables daily.He consumes 1 sweetened beverage(s) daily.He exercises with enough effort to increase his heart rate 30 to 60 minutes per day.  He exercises with enough effort to increase his heart rate 5 days per week.   He is taking medications regularly.       Right shoulder pain since yesterday  Was shooting bow and felt it come out of the socket and went back in when he fell down  Pain around deltoid now  Right hand dominant      In [place] - told DDD  [facility] in 2019 - degenerative change at C5/6  So much pain  Pain radiates to both shoulders  Constantly has to crack neck  Right arm falling asleep  Left foot going numb  Has low back pain and twists to pop it and can feel foot again      Moving in about 3 months    Review of Systems   Constitutional, HEENT, cardiovascular, pulmonary, gi and gu systems are negative, except as otherwise noted.      Objective    /81 (BP Location: Right arm, Patient Position: Sitting, Cuff Size: Adult Large)   Pulse 73   Temp 98.5  F (36.9  C) (Oral)   Wt 90.4 kg (199 lb 6.4 oz)   SpO2 99%   BMI 29.45 kg/m    Body mass index is 29.45 kg/m .  Physical Exam   GENERAL: healthy, alert and no distress  MS: no gross musculoskeletal defects noted, no edema. Neck with FROM. RUE with decreased ROM. Unable to perform apley scratch. Can only lift arm to about 90 degrees.  NEURO: Normal strength and tone, mentation intact and speech normal  PSYCH: mentation appears normal, affect normal/bright    Results for orders placed or performed in visit on 08/01/22   XR Shoulder Right G/E 3 Views     Status: None    Narrative    XR SHOULDER RIGHT G/E 3 VIEWS 8/1/2022 11:08 AM    HISTORY: dislocated shoulder yesterday and back into place after fell  to the ground; Shoulder injury, right, initial encounter    COMPARISON: None.    FINDINGS: Negative right shoulder. No fracture or dislocation.    ZEINAB RING,  MD         SYSTEM ID:  [id number]                   .  ..

## 2022-08-01 NOTE — PATIENT INSTRUCTIONS
Get permission from insurance before scheduling MRI - let us know if you need to get it at a different imaging center    To schedule your imaging exam at any of the [facility] imaging locations, please call [phone]    Shoulder: ice 10-15 min few times per day, ibuprofen 600-800 mg every 6 hours as needed

## 2022-08-05 NOTE — TELEPHONE ENCOUNTER
DIAGNOSIS: Shoulder injury, right   APPOINTMENT DATE: 08/22/2022   NOTES STATUS DETAILS   OFFICE NOTE from referring provider Internal 08/01/2022 Dr Zamora [facility]    OFFICE NOTE from other specialist N/A    DISCHARGE SUMMARY from hospital N/A    DISCHARGE REPORT from the ER N/A    OPERATIVE REPORT N/A    EMG report N/A    MEDICATION LIST N/A    MRI Internal 08/17/2022 cervical spine   DEXA (osteoporosis/bone health) N/A    CT SCAN N/A    XRAYS (IMAGES & REPORTS) Internal 08/01/2022 RT shoulder

## 2022-08-17 ENCOUNTER — ANCILLARY PROCEDURE (OUTPATIENT)
Dept: MRI IMAGING | Facility: CLINIC | Age: 37
End: 2022-08-17
Attending: NURSE PRACTITIONER
Payer: OTHER GOVERNMENT

## 2022-08-17 DIAGNOSIS — M54.12 CERVICAL RADICULOPATHY: ICD-10-CM

## 2022-08-17 PROCEDURE — 72141 MRI NECK SPINE W/O DYE: CPT | Mod: GC | Performed by: STUDENT IN AN ORGANIZED HEALTH CARE EDUCATION/TRAINING PROGRAM

## 2022-08-18 ENCOUNTER — MYC MEDICAL ADVICE (OUTPATIENT)
Dept: FAMILY MEDICINE | Facility: CLINIC | Age: 37
End: 2022-08-18

## 2022-08-22 ENCOUNTER — PRE VISIT (OUTPATIENT)
Dept: ORTHOPEDICS | Facility: CLINIC | Age: 37
End: 2022-08-22

## 2022-08-22 ENCOUNTER — OFFICE VISIT (OUTPATIENT)
Dept: ORTHOPEDICS | Facility: CLINIC | Age: 37
End: 2022-08-22
Attending: NURSE PRACTITIONER
Payer: OTHER GOVERNMENT

## 2022-08-22 VITALS — BODY MASS INDEX: 29.45 KG/M2 | WEIGHT: 199.4 LBS

## 2022-08-22 DIAGNOSIS — S49.91XA SHOULDER INJURY, RIGHT, INITIAL ENCOUNTER: ICD-10-CM

## 2022-08-22 DIAGNOSIS — M25.311 SHOULDER JOINT INSTABILITY, RIGHT: Primary | ICD-10-CM

## 2022-08-22 DIAGNOSIS — M54.12 RIGHT CERVICAL RADICULOPATHY: ICD-10-CM

## 2022-08-22 PROCEDURE — 99203 OFFICE O/P NEW LOW 30 MIN: CPT | Performed by: FAMILY MEDICINE

## 2022-08-22 ASSESSMENT — PAIN SCALES - GENERAL: PAINLEVEL: MILD PAIN (2)

## 2022-08-22 NOTE — LETTER
"    8/22/2022         RE: Marcus Valdez  [address]  [facility] [address]        Dear Colleague,    Thank you for referring your patient, Marcus Valdez, to the [facility] [place]. Please see a copy of my visit note below.    CHIEF COMPLAINT:  Pain of the Right Shoulder       HISTORY OF PRESENT ILLNESS  Mr. Valdez is a pleasant 37 year old year old male who presents to clinic today with right shoulder pain. Notes that it goes \"in and out of socket\". Two weeks ago, notes that he had an inferior subluxation moment. Usually happens when his hand is by his side or hand is over his head.    Onset:  Chronic with acute increase in pain.  Location: Right lateral shoulder with radiation into the lateral right neck.  Quality:  Sharp and electric pain  Duration: 2+ years   Severity: 10/10 at worst  Timing:intermittent episodes   Modifying factors:  resting and non-use makes it better, movement and use makes it worse  Associated signs & symptoms: Denies numbness and tingling  Previous similar pain: Yes  Treatments to date: Cervical MRI on 8/17/2022, stays active but no other treatment tried to date.    Additional history: Also notes cervical radicular pain travels on his shoulder and arm.  This is been present since 2019, and now is involving his arm feeling numbness and tingling.  MRI ordered by his PCP.  Flexeril was dispensed.    Review of Systems:    Have you recently had a a fever, chills, weight loss? No    Do you have any vision problems? No    Do you have any chest pain or edema? No    Do you have any shortness of breath or wheezing?  No    Do you have stomach problems? No    Do you have any numbness or focal weakness? No    Do you have diabetes? No    Do you have problems with bleeding or clotting? No    Do you have an rashes or other skin lesions? No    MEDICAL HISTORY  Patient Active Problem List   Diagnosis     Insomnia, unspecified type     ALISSON (generalized anxiety disorder) "     Family history of diabetes mellitus       Current Outpatient Medications   Medication Sig Dispense Refill     cyclobenzaprine (FLEXERIL) 10 MG tablet Take 0.5-1 tablets (5-10 mg) by mouth 3 times daily as needed for muscle spasms 30 tablet 1     FLUoxetine (PROZAC) 10 MG capsule Take 1 capsule (10 mg) by mouth daily 90 capsule 1     sildenafil (REVATIO) 20 MG tablet TAKE 1-5 TABS BY MOUTH 30 MINUTES TO 2 HOURS BEFORE SEXUAL INTERCOURSE. MAX 5 TAB IN 24 HOURS 90 tablet 0     traZODone (DESYREL) 50 MG tablet Take 1 tablet (50 mg) by mouth At Bedtime 90 tablet 1       No Known Allergies    Family History   Problem Relation Age of Onset     Diabetes Maternal Uncle        Additional medical/Social/Surgical histories reviewed in [facility] and updated as appropriate.       PHYSICAL EXAM  There were no vitals taken for this visit.    General  - normal appearance, in no obvious distress  Musculoskeletal - cervical spine  - inspection: normal bone and joint alignment, no obvious kyphosis  - palpation: Tenderness to palpation of trap, right cervical paraspinals  - ROM: pain with rotation and sidebending  - strength: upper extremities 5/5 in all planes  - special tests:  (-) Spurling bilaterally  Neuro  - C5-7 DTRs 2+ bilaterally, no sensory or motor deficit, grossly normal coordination, normal muscle tone  Musculoskeletal - right shoulder  - inspection: normal bone and joint alignment, no obvious deformity, no scapular winging, no AC step-off  - palpation: no bony or soft tissue tenderness, normal clavicle, non-tender AC  - ROM: Decreased terminal forward elevation and external rotation.  Intact internal rotation  - strength: 5/5  strength, 5/5 in all shoulder planes  - special tests:  (-) Speed's  (-) Neer  (-) Hawkin's  (-) Harley's  (+) Hardy's  (+) load & shift, seated  (+) apprehension  Neuro  - no sensory or motor deficit, grossly normal coordination, normal muscle tone  Skin  - no ecchymosis, erythema, warmth, or  induration, no obvious rash  Psych  - interactive, appropriate, normal mood and affect    IMAGING :   XR SHOULDER RIGHT G/E 3 VIEWS 8/1/2022 11:08 AM     HISTORY: dislocated shoulder yesterday and back into place after fell  to the ground; Shoulder injury, right, initial encounter     COMPARISON: None.     FINDINGS: Negative right shoulder. No fracture or dislocation.     ZEINAB RING MD         IMPRESSION:  1. Mild chronic compression deformity of C5 and C6 vertebral bodies,  resulting in focal reversal of the normal cervical lordosis at these  levels.  2. Multilevel degenerative changes. Most significantly there is  moderate to severe right neural foraminal stenosis at C5-6. Please  refer to body of the report for detailed evaluation of each level.     I have personally reviewed the examination and initial interpretation  and I agree with the findings.     BECCA ROUSE MD     Reviewed notes from PCP visit on 8/1/2022.    ASSESSMENT & PLAN  Mr. Valdez is a 37 year old year old male who presents to clinic today with chronic right shoulder instability as well as cervical radiculitis traveling down right upper extremity.    Diagnosis  1.  Cervical radiculitis affecting right upper extremity  2.  Shoulder instability right shoulder    He has had dislocations in the past for years, but it has never been occurring with his shoulder seemingly at side or with simple tasks.  He has not subluxing with very little provocation.    At this time I do feel an MRI would be warranted to determine whether there is structural damage that may preclude him from being able to rehabbed successfully.    We also discussed cervical radiculitis as well as treatment options.  He does have moderately severe right neuroforaminal stenosis at C5-C6.  We may start with physical therapy for his neck as well as her shoulder.  If this does persist, he would be candidate for considering epidural steroid injection.    I will MyChart message him  with the results of his MRI, he may continue with physical therapy unless otherwise stated after MRI.    It was a pleasure seeing Marcus today.    Teofilo Waller DO, [facility]  Primary Care Sports Medicine      Again, thank you for allowing me to participate in the care of your patient.        Sincerely,        Teofilo Waller DO

## 2022-08-22 NOTE — PATIENT INSTRUCTIONS
To schedule physical therapy: [phone]     To schedule MRI: [phone]. We will contact you with results.

## 2022-08-22 NOTE — PROGRESS NOTES
"CHIEF COMPLAINT:  Pain of the Right Shoulder       HISTORY OF PRESENT ILLNESS  Mr. Valdez is a pleasant 37 year old year old male who presents to clinic today with right shoulder pain. Notes that it goes \"in and out of socket\". Two weeks ago, notes that he had an inferior subluxation moment. Usually happens when his hand is by his side or hand is over his head.    Onset:  Chronic with acute increase in pain.  Location: Right lateral shoulder with radiation into the lateral right neck.  Quality:  Sharp and electric pain  Duration: 2+ years   Severity: 10/10 at worst  Timing:intermittent episodes   Modifying factors:  resting and non-use makes it better, movement and use makes it worse  Associated signs & symptoms: Denies numbness and tingling  Previous similar pain: Yes  Treatments to date: Cervical MRI on 8/17/2022, stays active but no other treatment tried to date.    Additional history: Also notes cervical radicular pain travels on his shoulder and arm.  This is been present since 2019, and now is involving his arm feeling numbness and tingling.  MRI ordered by his PCP.  Flexeril was dispensed.    Review of Systems:    Have you recently had a a fever, chills, weight loss? No    Do you have any vision problems? No    Do you have any chest pain or edema? No    Do you have any shortness of breath or wheezing?  No    Do you have stomach problems? No    Do you have any numbness or focal weakness? No    Do you have diabetes? No    Do you have problems with bleeding or clotting? No    Do you have an rashes or other skin lesions? No    MEDICAL HISTORY  Patient Active Problem List   Diagnosis     Insomnia, unspecified type     ALISSON (generalized anxiety disorder)     Family history of diabetes mellitus       Current Outpatient Medications   Medication Sig Dispense Refill     cyclobenzaprine (FLEXERIL) 10 MG tablet Take 0.5-1 tablets (5-10 mg) by mouth 3 times daily as needed for muscle spasms 30 tablet 1     FLUoxetine " (PROZAC) 10 MG capsule Take 1 capsule (10 mg) by mouth daily 90 capsule 1     sildenafil (REVATIO) 20 MG tablet TAKE 1-5 TABS BY MOUTH 30 MINUTES TO 2 HOURS BEFORE SEXUAL INTERCOURSE. MAX 5 TAB IN 24 HOURS 90 tablet 0     traZODone (DESYREL) 50 MG tablet Take 1 tablet (50 mg) by mouth At Bedtime 90 tablet 1       No Known Allergies    Family History   Problem Relation Age of Onset     Diabetes Maternal Uncle        Additional medical/Social/Surgical histories reviewed in [facility] and updated as appropriate.       PHYSICAL EXAM  There were no vitals taken for this visit.    General  - normal appearance, in no obvious distress  Musculoskeletal - cervical spine  - inspection: normal bone and joint alignment, no obvious kyphosis  - palpation: Tenderness to palpation of trap, right cervical paraspinals  - ROM: pain with rotation and sidebending  - strength: upper extremities 5/5 in all planes  - special tests:  (-) Spurling bilaterally  Neuro  - C5-7 DTRs 2+ bilaterally, no sensory or motor deficit, grossly normal coordination, normal muscle tone  Musculoskeletal - right shoulder  - inspection: normal bone and joint alignment, no obvious deformity, no scapular winging, no AC step-off  - palpation: no bony or soft tissue tenderness, normal clavicle, non-tender AC  - ROM: Decreased terminal forward elevation and external rotation.  Intact internal rotation  - strength: 5/5  strength, 5/5 in all shoulder planes  - special tests:  (-) Speed's  (-) Neer  (-) Hawkin's  (-) Harley's  (+) Lissie's  (+) load & shift, seated  (+) apprehension  Neuro  - no sensory or motor deficit, grossly normal coordination, normal muscle tone  Skin  - no ecchymosis, erythema, warmth, or induration, no obvious rash  Psych  - interactive, appropriate, normal mood and affect    IMAGING :   XR SHOULDER RIGHT G/E 3 VIEWS 8/1/2022 11:08 AM     HISTORY: dislocated shoulder yesterday and back into place after fell  to the ground; Shoulder injury, right,  initial encounter     COMPARISON: None.     FINDINGS: Negative right shoulder. No fracture or dislocation.     ZEINAB RING MD         IMPRESSION:  1. Mild chronic compression deformity of C5 and C6 vertebral bodies,  resulting in focal reversal of the normal cervical lordosis at these  levels.  2. Multilevel degenerative changes. Most significantly there is  moderate to severe right neural foraminal stenosis at C5-6. Please  refer to body of the report for detailed evaluation of each level.     I have personally reviewed the examination and initial interpretation  and I agree with the findings.     BECCA ROUSE MD     Reviewed notes from PCP visit on 8/1/2022.    ASSESSMENT & PLAN  Mr. Valdez is a 37 year old year old male who presents to clinic today with chronic right shoulder instability as well as cervical radiculitis traveling down right upper extremity.    Diagnosis  1.  Cervical radiculitis affecting right upper extremity  2.  Shoulder instability right shoulder    He has had dislocations in the past for years, but it has never been occurring with his shoulder seemingly at side or with simple tasks.  He has not subluxing with very little provocation.    At this time I do feel an MRI would be warranted to determine whether there is structural damage that may preclude him from being able to rehabbed successfully.    We also discussed cervical radiculitis as well as treatment options.  He does have moderately severe right neuroforaminal stenosis at C5-C6.  We may start with physical therapy for his neck as well as her shoulder.  If this does persist, he would be candidate for considering epidural steroid injection.    I will MyChart message him with the results of his MRI, he may continue with physical therapy unless otherwise stated after MRI.    It was a pleasure seeing Marcus today.    Teofilo Waller DO, [facility]  Primary Care Sports Medicine

## 2022-09-01 ENCOUNTER — OFFICE VISIT (OUTPATIENT)
Dept: SLEEP MEDICINE | Facility: CLINIC | Age: 37
End: 2022-09-01
Attending: PREVENTIVE MEDICINE
Payer: OTHER GOVERNMENT

## 2022-09-01 VITALS
HEART RATE: 77 BPM | BODY MASS INDEX: 28.35 KG/M2 | HEIGHT: 69 IN | SYSTOLIC BLOOD PRESSURE: 126 MMHG | WEIGHT: 191.4 LBS | OXYGEN SATURATION: 98 % | DIASTOLIC BLOOD PRESSURE: 77 MMHG

## 2022-09-01 DIAGNOSIS — R40.0 SLEEPINESS: ICD-10-CM

## 2022-09-01 DIAGNOSIS — F51.04 CHRONIC INSOMNIA: ICD-10-CM

## 2022-09-01 DIAGNOSIS — R06.83 SNORING: Primary | ICD-10-CM

## 2022-09-01 DIAGNOSIS — G47.30 OBSERVED SLEEP APNEA: ICD-10-CM

## 2022-09-01 PROCEDURE — 99205 OFFICE O/P NEW HI 60 MIN: CPT | Performed by: PHYSICIAN ASSISTANT

## 2022-09-01 RX ORDER — DOXEPIN HYDROCHLORIDE 10 MG/1
10 CAPSULE ORAL AT BEDTIME
Qty: 30 CAPSULE | Refills: 0 | Status: SHIPPED | OUTPATIENT
Start: 2022-09-01

## 2022-09-01 NOTE — PATIENT INSTRUCTIONS
"      MY TREATMENT INFORMATION FOR SLEEP APNEA-  Marcus Valdez    DOCTOR : Bennett Goltz, PA-NAKUL    Am I having a sleep study at a sleep center?  --->Due to normal delays, you will be contacted within 2-4 weeks to schedule    Am I having a home sleep study?  --->Watch the video for the device you are using:      -Disposable device sent out require phone/computer application-   https://www.[employer].com/watch?v=BCce_vbiwxE      Frequently asked questions:  1. What is Obstructive Sleep Apnea (NATHANIEL)? NATHANIEL is the most common type of sleep apnea. Apnea means, \"without breath.\"  Apnea is most often caused by narrowing or collapse of the upper airway as muscles relax during sleep.   Almost everyone has occasional apneas. Most people with sleep apnea have had brief interruptions at night frequently for many years.  The severity of sleep apnea is related to how frequent and severe the events are.   2. What are the consequences of NATHANIEL? Symptoms include: feeling sleepy during the day, snoring loudly, gasping or stopping of breathing, trouble sleeping, and occasionally morning headaches or heartburn at night.  Sleepiness can be serious and even increase the risk of falling asleep while driving. Other health consequences may include development of high blood pressure and other cardiovascular disease in persons who are susceptible. Untreated NATHANIEL  can contribute to heart disease, stroke and diabetes.   3. What are the treatment options? In most situations, sleep apnea is a lifelong disease that must be managed with daily therapy. Medications are not effective for sleep apnea and surgery is generally not considered until other therapies have been tried. Your treatment is your choice . Continuous Positive Airway (CPAP) works right away and is the therapy that is effective in nearly everyone. An oral device to hold your jaw forward is usually the next most reliable option. Other options include postioning devices (to keep you off your " back), weight loss, and surgery including a tongue pacing device. There is more detail about some of these options below.  4. Are my sleep studies covered by insurance? Although we will request verification of coverage, we advise you also check in advance of the study to ensure there is coverage.    Important tips for those choosing CPAP and similar devices   Know your equipment:  CPAP is continuous positive airway pressure that prevents obstructive sleep apnea by keeping the throat from collapsing while you are sleeping. In most cases, the device is  smart  and can slowly self-adjusts if your throat collapses and keeps a record every day of how well you are treated-this information is available to you and your care team.  BPAP is bilevel positive airway pressure that keeps your throat open and also assists each breath with a pressure boost to maintain adequate breathing.  Special kinds of BPAP are used in patients who have inadequate breathing from lung or heart disease. In most cases, the device is  smart  and can slowly self-adjusts to assist breathing. Like CPAP, the device keeps a record of how well you are treated.  Your mask is your connection to the device. You get to choose what feels most comfortable and the staff will help to make sure if fits. Here: are some examples of the different masks that are available:       Key points to remember on your journey with sleep apnea:  Sleep study.  PAP devices often need to be adjusted during a sleep study to show that they are effective and adjusted right.  Good tips to remember: Try wearing just the mask during a quiet time during the day so your body adapts to wearing it. A humidifier is recommended for comfort in most cases to prevent drying of your nose and throat. Allergy medication from your provider may help you if you are having nasal congestion.  Getting settled-in. It takes more than one night for most of us to get used to wearing a mask. Try wearing just  the mask during a quiet time during the day so your body adapts to wearing it. A humidifier is recommended for comfort in most cases. Our team will work with you carefully on the first day and will be in contact within 4 days and again at 2 and 4 weeks for advice and remote device adjustments. Your therapy is evaluated by the device each day.   Use it every night. The more you are able to sleep naturally for 7-8 hours, the more likely you will have good sleep and to prevent health risks or symptoms from sleep apnea. Even if you use it 4 hours it helps. Occasionally all of us are unable to use a medical therapy, in sleep apnea, it is not dangerous to miss one night.   Communicate. Call our skilled team on the number provided on the first day if your visit for problems that make it difficult to wear the device. Over 2 out of 3 patients can learn to wear the device long-term with help from our team. Remember to call our team or your sleep providers if you are unable to wear the device as we may have other solutions for those who cannot adapt to mask CPAP therapy. It is recommended that you sleep your sleep provider within the first 3 months and yearly after that if you are not having problems.   Use it for your health. We encourage use of CPAP masks during daytime quiet periods to allow your face and brain to adapt to the sensation of CPAP so that it will be a more natural sensation to awaken to at night or during naps. This can be very useful during the first few weeks or months of adapting to CPAP though it does not help medically to wear CPAP during wakefulness and  should not be used as a strategy just to meet guidelines.  Take care of your equipment. Make sure you clean your mask and tubing using directions every day and that your filter and mask are replaced as recommended or if they are not working.     BESIDES CPAP, WHAT OTHER THERAPIES ARE THERE?    Positioning Device  Positioning devices are generally used  when sleep apnea is mild and only occurs on your back.This example shows a pillow that straps around the waist. It may be appropriate for those whose sleep study shows milder sleep apnea that occurs primarily when lying flat on one's back. Preliminary studies have shown benefit but effectiveness at home may need to be verified by a home sleep test. These devices are generally not covered by medical insurance.  Examples of devices that maintain sleeping on the back to prevent snoring and mild sleep apnea.    Belt type body positioner  http://[employer]rightpearl/    Electronic reminder  http://nightshifttherapy.com/  http://www.[employer].au/      Oral Appliance  What is oral appliance therapy?  An oral appliance device fits on your teeth at night like a retainer used after having braces. The device is made by a specialized dentist and requires several visits over 1-2 months before a manufactured device is made to fit your teeth and is adjusted to prevent your sleep apnea. Once an oral device is working properly, snoring should be improved. A home sleep test may be recommended at that time if to determine whether the sleep apnea is adequately treated.       Some things to remember:  -Oral devices are often, but not always, covered by your medical insurance. Be sure to check with your insurance provider.   -If you are referred for oral therapy, you will be given a list of specialized dentists to consider or you may choose to visit the Web site of the American Academy of Dental Sleep Medicine  -Oral devices are less likely to work if you have severe sleep apnea or are extremely overweight.     More detailed information  An oral appliance is a small acrylic device that fits over the upper and lower teeth  (similar to a retainer or a mouth guard). This device slightly moves jaw forward, which moves the base of the tongue forward, opens the airway, improves breathing for effective treat snoring and obstructive sleep apnea in  perhaps 7 out of 10 people .  The best working devices are custom-made by a dental device  after a mold is made of the teeth 1, 2, 3.  When is an oral appliance indicated?  Oral appliance therapy is recommended as a first-line treatment for patients with primary snoring, mild sleep apnea, and for patients with moderate sleep apnea who prefer appliance therapy to use of CPAP4, 5. Severity of sleep apnea is determined by sleep testing and is based on the number of respiratory events per hour of sleep.   How successful is oral appliance therapy?  The success rate of oral appliance therapy in patients with mild sleep apnea is 75-80% while in patients with moderate sleep apnea it is 50-70%. The chance of success in patients with severe sleep apnea is 40-50%. The research also shows that oral appliances have a beneficial effect on the cardiovascular health of NATHANIEL patients at the same magnitude as CPAP therapy7.  Oral appliances should be a second-line treatment in cases of severe sleep apnea, but if not completely successful then a combination therapy utilizing CPAP plus oral appliance therapy may be effective. Oral appliances tend to be effective in a broad range of patients although studies show that the patients who have the highest success are females, younger patients, those with milder disease, and less severe obesity. 3, 6.   Finding a dentist that practices dental sleep medicine  Specific training is available through the American Academy of Dental Sleep Medicine for dentists interested in working in the field of sleep. To find a dentist who is educated in the field of sleep and the use of oral appliances, near you, visit the Web site of the American Academy of Dental Sleep Medicine.    References  1. Madelyn et al. Objectively measured vs self-reported compliance during oral appliance therapy for sleep-disordered breathing. Chest 2013; 144(5): 1444-3334.  2. Jonh et al. Objective  measurement of compliance during oral appliance therapy for sleep-disordered breathing. Thorax 2013; 68(1): 91-96.  3. Ana María et al. Mandibular advancement devices in 620 men and women with NATHANIEL and snoring: tolerability and predictors of treatment success. Chest 2004; 125: 0528-7227.  4. Stevan et al. Oral appliances for snoring and NATHANIEL: a review. Sleep 2006; 29: 244-262.  5. Katya et al. Oral appliance treatment for NATHANIEL: an update. J Clin Sleep Med 2014; 10(2): 215-227.  6. Minoo et al. Predictors of OSAH treatment outcome. J Dent Res 2007; 86: 6400-5010.      Weight Loss:    Weight loss is a long-term strategy that may improve sleep apnea in some patients.    Weight management is a personal decision and the decision should be based on your interest and the potential benefits.  If you are interested in exploring weight loss strategies, the following discussion covers the impact on weight loss on sleep apnea and the approaches that may be successful.    Being overweight does not necessarily mean you will have health consequences.  Those who have BMI over 35 or over 27 with existing medical conditions carries greater risk.   Weight loss decreases severity of sleep apnea in most people with obesity. For those with mild obesity who have developed snoring with weight gain, even 15-30 pound weight loss can improve and occasionally eliminate sleep apnea.  Structured and life-long dietary and health habits are necessary to lose weight and keep healthier weight levels.     Though there may be significant health benefits from weight loss, long-term weight loss is very difficult to achieve- studies show success with dietary management in less than 10% of people. In addition, substantial weight loss may require years of dietary control and may be difficult if patients have severe obesity. In these cases, surgical management may be considered.  Finally, older individuals who have tolerated obesity without health  complications may be less likely to benefit from weight loss strategies.      [unfilled]    Surgery:    Surgery for obstructive sleep apnea is considered generally only when other therapies fail to work. Surgery may be discussed with you if you are having a difficult time tolerating CPAP and or when there is an abnormal structure that requires surgical correction.  Nose and throat surgeries often enlarge the airway to prevent collapse.  Most of these surgeries create pain for 1-2 weeks and up to half of the most common surgeries are not effective throughout life.  You should carefully discuss the benefits and drawbacks to surgery with your sleep provider and surgeon to determine if it is the best solution for you.   More information  Surgery for NATHANIEL is directed at areas that are responsible for narrowing or complete obstruction of the airway during sleep.  There are a wide range of procedures available to enlarge and/or stabilize the airway to prevent blockage of breathing in the three major areas where it can occur: the palate, tongue, and nasal regions.  Successful surgical treatment depends on the accurate identification of the factors responsible for obstructive sleep apnea in each person.  A personalized approach is required because there is no single treatment that works well for everyone.  Because of anatomic variation, consultation with an examination by a sleep surgeon is a critical first step in determining what surgical options are best for each patient.  In some cases, examination during sedation may be recommended in order to guide the selection of procedures.  Patients will be counseled about risks and benefits as well as the typical recovery course after surgery. Surgery is typically not a cure for a person s NATHANIEL.  However, surgery will often significantly improve one s NATHANIEL severity (termed  success rate ).  Even in the absence of a cure, surgery will decrease the cardiovascular risk associated with  OSA7; improve overall quality of life8 (sleepiness, functionality, sleep quality, etc).      Palate Procedures:  Patients with NATHANIEL often have narrowing of their airway in the region of their tonsils and uvula.  The goals of palate procedures are to widen the airway in this region as well as to help the tissues resist collapse.  Modern palate procedure techniques focus on tissue conservation and soft tissue rearrangement, rather than tissue removal.  Often the uvula is preserved in this procedure. Residual sleep apnea is common in patient after pharyngoplasty with an average reduction in sleep apnea events of 33%2.      Tongue Procedures:  ExamWhile patients are awake, the muscles that surround the throat are active and keep this region open for breathing. These muscles relax during sleep, allowing the tongue and other structures to collapse and block breathing.  There are several different tongue procedures available.  Selection of a tongue base procedure depends on characteristics seen on physical exam.  Generally, procedures are aimed at removing bulky tissues in this area or preventing the back of the tongue from falling back during sleep.  Success rates for tongue surgery range from 50-62%3.    Hypoglossal Nerve Stimulation:  Hypoglossal nerve stimulation has recently received approval from the [place] Food and Drug Administration for the treatment of obstructive sleep apnea.  This is based on research showing that the system was safe and effective in treating sleep apnea6.  Results showed that the median AHI score decreased 68%, from 29.3 to 9.0. This therapy uses an implant system that senses breathing patterns and delivers mild stimulation to airway muscles, which keeps the airway open during sleep.  The system consists of three fully implanted components: a small generator (similar in size to a pacemaker), a breathing sensor, and a stimulation lead.  Using a small handheld remote, a patient turns the  therapy on before bed and off upon awakening.    Candidates for this device must be greater than 22 years of age, have moderate to severe NATHANIEL (AHI between 20-65), BMI less than 32, have tried CPAP/oral appliance without success, and have appropriate upper airway anatomy (determined by a sleep endoscopy performed by Dr. Wagner).    Hypoglossal Nerve Stimulation Pathway:    The sleep surgeon s office will work with the patient through the insurance prior-authorization process (including communications and appeals).    Nasal Procedures:  Nasal obstruction can interfere with nasal breathing during the day and night.  Studies have shown that relief of nasal obstruction can improve the ability of some patients to tolerate positive airway pressure therapy for obstructive sleep apnea1.  Treatment options include medications such as nasal saline, topical corticosteroid and antihistamine sprays, and oral medications such as antihistamines or decongestants. Non-surgical treatments can include external nasal dilators for selected patients. If these are not successful by themselves, surgery can improve the nasal airway either alone or in combination with these other options.      Combination Procedures:  Combination of surgical procedures and other treatments may be recommended, particularly if patients have more than one area of narrowing or persistent positional disease.  The success rate of combination surgery ranges from 66-80%2,3.    References  Ramo MOTA. The Role of the Nose in Snoring and Obstructive Sleep Apnoea: An Update.  Eur Arch Otorhinolaryngol. 2011; 268: 1365-73.   Wilfredo SM; Bruce JA; Britta JR; Pallanch JF; Lashanda MB; Lucy SG; Keny BROTHERS. Surgical modifications of the upper airway for obstructive sleep apnea in adults: a systematic review and meta-analysis. SLEEP 2010;33(10):3758-8234. Becka ARRIAGA. Hypopharyngeal surgery in obstructive sleep apnea: an evidence-based medicine review.  Arch Otolaryngol Head  Neck Surg. 2006 Feb;132(2):206-13.  Delfino YH1, Emma Y, Samuel ADELINE. The efficacy of anatomically based multilevel surgery for obstructive sleep apnea. Otolaryngol Head Neck Surg. 2003 Oct;129(4):327-35.  Kezirian E, Goldberg A. Hypopharyngeal Surgery in Obstructive Sleep Apnea: An Evidence-Based Medicine Review. Arch Otolaryngol Head Neck Surg. 2006 Feb;132(2):206-13.  Breanna CANDELARIO et al. Upper-Airway Stimulation for Obstructive Sleep Apnea.  N Engl J Med. 2014 Jan 9;370(2):139-49.  Lucina Y et al. Increased Incidence of Cardiovascular Disease in Middle-aged Men with Obstructive Sleep Apnea. Am J Respir Crit Care Med; 2002 166: 159-165  Richterindia MULLEN et al. Studying Life Effects and Effectiveness of Palatopharyngoplasty (SLEEP) study: Subjective Outcomes of Isolated Uvulopalatopharyngoplasty. Otolaryngol Head Neck Surg. 2011; 144: 623-631.    WHAT IF I ONLY HAVE SNORING?    Mandibular advancement devices, lateral sleep positioning, long-term weight loss and treatment of nasal allergies have been shown to improve snoring.  Exercising tongue muscles with a game (Skydeckttps://apps.Matchalarm/us/saloni/soundly-reduce-snoring/zj8412079902) or stimulating the tongue during the day with a device (https://doi.org/10.3390/xmi26576008) have improved snoring in some individuals.    Remember to Drive Safe... Drive Alive     Sleep health profoundly affects your health, mood, and your safety.  Thirty three percent of the population (one in three of us) is not getting enough sleep and many have a sleep disorder. Not getting enough sleep or having an untreated / undertreated sleep condition may make us sleepy without even knowing it. In fact, our driving could be dramatically impaired due to our sleep health. As your provider, here are some things I would like you to know about driving:     Here are some warning signs for impairment and dangerous drowsy driving:              -Having been awake more than 16 hours               -Looking tired                -Eyelid drooping              -Head nodding (it could be too late at this point)              -Driving for more than 30 minutes     Some things you could do to make the driving safer if you are experiencing some drowsiness:              -Stop driving and rest              -Call for transportation              -Make sure your sleep disorder is adequately treated     Some things that have been shown NOT to work when experiencing drowsiness while driving:              -Turning on the radio              -Opening windows              -Eating any  distracting  /  entertaining  foods (e.g., sunflower seeds, candy, or any other)              -Talking on the phone      Your decision may not only impact your life, but also the life of others. Please, remember to drive safe for yourself and all of us.    General recommendations for sleep problems (Insomnia)  Allow 2-4 weeks to see results     Establish a regular sleep schedule    Most people only need 7-8 hours of sleep.  Don't be in bed longer than you need     to sleep or you will end up spending more time awake in bed. This trains your    brain to think of the bed as a place to not sleep.  Go to bed at same time each night   Get up at same time each day - Set an alarm everyday (even weekends). This is one of    the most important tips. It prevents you from relying on your insomnia to get you    up on time for your day. That actually reinforces insomnia. It also will help your    body get into a pattern where you start feeling tired at a consistent time each    night.  The body functions best when you keep a consistent routine.  Avoid sleeping-in and napping. Anytime you sleep during the day, you will be less tired at    night. You may be tired enough to fall asleep, but you will wake more in the    middle of the night because you will have met your sleep need before the night is    done.   Cut down time in bed (if not asleep, get up)- Use your bed only for sleep and  sex    Anytime you spend time in bed doing activities other than sleep (reading,    watching TV, working, playing on the computer or phone, or even just laying in    bed trying to sleep), you are training  your brain to think of the bed as a place to    do activities other than sleep. If you are not falling asleep within 20-30 minutes,    get out of bed. While out of bed, avoid bright lights. Avoid work or chores. Being    productive in the middle of the night reinforces waking up at night. Find relaxing,    not particularly entertaining activities like reading, listening to music, or relaxation    exercises. Go back to bed if you start feeling groggy, or after about 30 minutes,    even if not feeling very tired. Sometimes, just getting out of bed stops the pattern    of getting frustrated about laying in bed not sleeping, and that can help you fall    asleep.   Avoid trying to force yourself to sleep- sleep is not like everything else. The harder you    work at most things, the more you can accomplish. The harder you work at    sleep, the less you will sleep.     Make the bedroom comfortable - quiet, dark and cool are better. Consider ear plugs    (silicon). Use dark blinds or wear an eye mask if needed     Make a relaxing routine prior to bedtime  Relaxation exercises:   Progressive muscle relaxation: Relax each muscle group individually    Begin with your feet, flex, then relax. Try to imagine your feet feeling heavy and sinking into the bed. Move to your calves, do the same thing. Work through each muscle group toward your head.    Relaxing Mental Imagery: Try to imagine a trip that you took and found relaxing, or imagine a day at the beach. Try to walk yourself through the day in your mind as if you were dreaming it. Try to imagine sensing the different experiences, such as feeling sand between your toes, the heat of the sun on your skin, seeing the waves crashing the shore, the smell of the salt water, etc.      Deal with your worries before bedtime    Set aside a worry time around dinner time for 10-15 minutes. Write down the    things that are on your mind. Plan time in the coming days to address those    issues. Brainstorm ideas on how you will deal with them. Try to identify issues    that are out of your control, and try to let those issues go.  Listen to relaxation tapes   Classical Music or Nature sounds   Back Massage   Get regular exercise each day (at least 1-2 hours before bedtime)   Take medications only as directed   Eat a light bedtime snack or warm drink   Warm milk   Warm herbal tea (non-caffeinated)       Things to avoid   No overstimulating activities just before bed   No competitive games before bedtime   No exciting television programs before bedtime   Avoid caffeine after lunchtime   Avoid chocolate   Do not use alcohol to induce sleep (worsens Insomnia)   Do not take someone else's sleeping pills   Do not look at the clock when awakening   Do not turn on light when getting up to use bathroom, use a nightlight     Online Programs   www.[employer]form[employer]ics (pronounced shut eye). There is a fee for this program. Enter the code  [place]  if you decide to enroll in this program.    www.sleepIO.com (pronounced sleep ee oh). There is a fee for this program. Enter the code  [place]  if you decide to enroll in this program.     Suggested Resources  Insomnia Treatment Books   Overcoming Insomnia by Toño Carpio and Ghada Hinojosa (2008)  No More Sleepless Nights by Yoandy Lombardo and Traci Vizcarra (1996)  Say Tess to Insomnia by Soren Mcrae (2009)  The Insomnia Workbook by Silvana Ferro and Alec Sandoval (2009)  The Insomnia Answer by Srikanth Cobian and Ross Barfield (2006)      Stress Management and Relaxation Books  The Relaxation and Stress Reduction Workbook by Minoo Castañeda, Vika Lucero and Urban Kirkpatrick (2008)  Stress Management Workbook: Techniques and Self-Assessment Procedures  by Denise Addison and Stef Espinosa (1997)  A Mindfulness-Based Stress Reduction Workbook by Barrington Cardona and Michelle Foy (2010)  The Complete Stress Management Workbook by James Howard, Carmine Arroyo and Pelon Murillo (1996)  Assert Yourself by Katherine Da Silva and Kenton Da Silva (1977)    Relaxation Resources for Computer Download   These websites offer resources to help you relax. This list is for information only. [facility] is not responsible for the quality of services or the actions of any person or organization.  Progressive Muscle Relaxation (PMR):   http://www.[employer]/progressive-muscle-relaxation-exercise.html   http://studentsupport.[facility]/counseling/resources/self-help/relaxation-and-stress-management/   Deep Breathing Exercises:  http://www.[employer]/breathing-awareness.html     Meditation:   www.[employer]  www.Dailyplaces GmbHguidedSportsBeepmeditation-site.[employer] You may have to pay for some of these resources.    Guided Imagery:  http://www.[employer]/guided-imagery-scripts.html   http://Ingageapp/library/ipkwiouqzd-idxkbe-catbsfj/   Consider phone apps such as: Calm, Headspace or Insight Timer.    Counseling / Behavioral Health  [facility]  Visit www.[facility].org or call [phone] to find a clinic close to you.  Or call [phone] for [facility].
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
## 2022-09-01 NOTE — NURSING NOTE
"Chief Complaint   Patient presents with     Sleep Problem     Stop breathing in sleep, difficulty sleeping        Initial /77   Pulse 77   Ht 1.753 m (5' 9\")   Wt 86.8 kg (191 lb 6.4 oz)   SpO2 98%   BMI 28.26 kg/m   Estimated body mass index is 28.26 kg/m  as calculated from the following:    Height as of this encounter: 1.753 m (5' 9\").    Weight as of this encounter: 86.8 kg (191 lb 6.4 oz).    Medication Reconciliation: complete    Neck circumference: 16 inches / 42 centimeters.  ESS 11  CADENCE 18  Jaclyn Bedoya MA      "

## 2022-09-01 NOTE — NURSING NOTE
Patient scheduled for mail out watchpat, and scheduled for a virtual return visit with Bennett Goltz, for the results.  Patient is in the  [employer], going to [place], by November 2022.

## 2022-09-06 ENCOUNTER — VIRTUAL VISIT (OUTPATIENT)
Dept: SLEEP MEDICINE | Facility: CLINIC | Age: 37
End: 2022-09-06
Payer: OTHER GOVERNMENT

## 2022-09-06 DIAGNOSIS — G47.30 OBSERVED SLEEP APNEA: ICD-10-CM

## 2022-09-06 DIAGNOSIS — R40.0 SLEEPINESS: ICD-10-CM

## 2022-09-06 DIAGNOSIS — R06.83 SNORING: ICD-10-CM

## 2022-09-06 PROCEDURE — 95800 SLP STDY UNATTENDED: CPT | Performed by: INTERNAL MEDICINE

## 2022-09-06 NOTE — PROGRESS NOTES
Device has been registered and shipped via [employer] on 9/6/2022. Patient was notified that package was mailed out.

## 2022-09-08 ENCOUNTER — TELEPHONE (OUTPATIENT)
Dept: ORTHOPEDICS | Facility: CLINIC | Age: 37
End: 2022-09-08

## 2022-09-08 NOTE — TELEPHONE ENCOUNTER
[facility] Call Center    Phone Message    May a detailed message be left on voicemail: yes     Reason for Call: Other: Patient is stating an auth needs to be sent to insurance for approval of MRI. Please contact patient and push for auth      Action Taken: Message routed to:  [facility] ortho    Travel Screening: Not Applicable

## 2022-09-09 NOTE — PROCEDURES
"WatchPAT - HOME SLEEP STUDY INTERPRETATION    Patient: Marcus Valdez  MRN: [id number]  YOB: 1985  Study Date: 2022  Referring Provider: Nandini Zamora;   Ordering Provider: Bennett Goltz, PA-C      Indications for Home Study: Marcus Valdez is a 37 year old male who presents with symptoms suggestive of obstructive sleep apnea.    Estimated body mass index is 28.26 kg/m  as calculated from the following:    Height as of 22: 1.753 m (5' 9\").    Weight as of 22: 86.8 kg (191 lb 6.4 oz).  Total score - [place]ena: 11 (2022 10:16 AM)  STOP-BAN/8    Data: A full night home sleep study was performed recording the standard physiologic parameters including peripheral arterial tonometry (PAT), sound/snoring, body position,  movement, sound, and oxygen saturation by pulse oximetry. Pulse rate was estimated by oximetry recording. Sleep staging (wake, REM, light, and deep sleep) was derived from PAT signal.  This study was considered adequate based on > 4 hours of quality oximetry and respiratory recording. As specified by the AASM Manual for the Scoring of Sleep and Associated events, version 2.3, Rule VIII.D 1B, 4% oxygen desaturation scoring for hypopneas is used as a standard of care on all home sleep apnea testing.    Total Recording Time: 6 hrs, 10 min  Total Sleep Time: 4 hrs, 36 min  % of Sleep Time REM: 30.8%    Respiratory:  Snoring: Snoring was present.  Respiratory events: The PAT respiratory disturbance index [pRDI] was 9.8 events per hour.  The PAT apnea/hypopnea index [pAHI] was 4.6 events per hour.  IDANIA was 3.9 events per hour.  During REM sleep the pAHI was 11.4.  Sleep Associated Hypoxemia: sustained hypoxemia was not present. Mean oxygen saturation was 94%.  Minimum was 87%.  Time with saturation less than 88% was 0.2 minutes.    Heart Rate: By pulse oximetry normal rate was noted.     Position: Percent of time spent: supine - 66.3%, prone - 14.1%, on right - 13.8%, on " left - 5.8%.  pAHI was 6.6 per hour supine, 0 per hour prone, 0 per hour on right side, and 3.8 per hour on left side.     Assessment:   Negative for clinically significant degree of apneas or hypopneas.  Sleep associated hypoxemia was not present.    Recommendations:  Consider in lab PSG for accurate assessment of sleep disordered breathing.   Suggest optimizing sleep hygiene and avoiding sleep deprivation.  Weight management.    Diagnosis Code(s): Snoring R06.83    Kaushik Fung MD, September 8, 2022   Diplomate, American Board of Internal Medicine, Sleep Medicine

## 2022-09-09 NOTE — TELEPHONE ENCOUNTER
Type of Procedure: MR Shoulder Right w/o Contrast [[id number]] (Order [id number])  CPT Codes: 32818  ICD10 Codes: Shoulder joint instability, right [M25.311]  - Primary   Surgeon/Ordering provider: [id number]  Teofilo Waller  Pre-cert/Authorization completed:  pending review  Payer: Ashlyn chowdary  Luciano to [employer] portal  Ref. # Form has been Submitted successfully with AuthID : [id number]/ Auth #   Valid Dates:     We will notify the patient once we receive a decision from the insurance.

## 2022-09-09 NOTE — TELEPHONE ENCOUNTER
PB DOS: TBD  Type of Procedure: MR Shoulder Right w/o Contrast [[id number]] (Order [id number])   CPT Codes: 84539  ICD10 Codes: Shoulder joint instability, right [M25.311] - Primary   Surgeon/Ordering provider: [id number] Teofilo Waller   Pre-cert/Authorization completed: Approved    Payer: Ashlyn chowdary   Spoke to [employer] portal   Ref. # Form has been Submitted successfully with AuthID : [id number]/ Auth # [id number]  Valid Dates: 9/9/2022-3/8/2023      Prior Authorization has been Approved for the MRI of right shoulder w/o contrast. Please contact patient to schedule and have patient verify coverage and benefits with Ashlyn [employer].    Thanks!

## 2022-09-13 ENCOUNTER — VIRTUAL VISIT (OUTPATIENT)
Dept: SLEEP MEDICINE | Facility: CLINIC | Age: 37
End: 2022-09-13
Payer: OTHER GOVERNMENT

## 2022-09-13 VITALS — BODY MASS INDEX: 27.85 KG/M2 | WEIGHT: 188 LBS | HEIGHT: 69 IN

## 2022-09-13 DIAGNOSIS — F51.04 CHRONIC INSOMNIA: Primary | ICD-10-CM

## 2022-09-13 DIAGNOSIS — G47.8 UARS (UPPER AIRWAY RESISTANCE SYNDROME): ICD-10-CM

## 2022-09-13 PROCEDURE — 99214 OFFICE O/P EST MOD 30 MIN: CPT | Mod: 95 | Performed by: PHYSICIAN ASSISTANT

## 2022-09-13 ASSESSMENT — SLEEP AND FATIGUE QUESTIONNAIRES
HOW LIKELY ARE YOU TO NOD OFF OR FALL ASLEEP WHILE SITTING AND TALKING TO SOMEONE: WOULD NEVER DOZE
HOW LIKELY ARE YOU TO NOD OFF OR FALL ASLEEP WHILE SITTING AND READING: HIGH CHANCE OF DOZING
HOW LIKELY ARE YOU TO NOD OFF OR FALL ASLEEP WHILE SITTING INACTIVE IN A PUBLIC PLACE: SLIGHT CHANCE OF DOZING
HOW LIKELY ARE YOU TO NOD OFF OR FALL ASLEEP WHEN YOU ARE A PASSENGER IN A CAR FOR AN HOUR WITHOUT A BREAK: HIGH CHANCE OF DOZING
HOW LIKELY ARE YOU TO NOD OFF OR FALL ASLEEP WHILE LYING DOWN TO REST IN THE AFTERNOON WHEN CIRCUMSTANCES PERMIT: SLIGHT CHANCE OF DOZING
HOW LIKELY ARE YOU TO NOD OFF OR FALL ASLEEP IN A CAR, WHILE STOPPED FOR A FEW MINUTES IN TRAFFIC: WOULD NEVER DOZE
HOW LIKELY ARE YOU TO NOD OFF OR FALL ASLEEP WHILE SITTING QUIETLY AFTER LUNCH WITHOUT ALCOHOL: SLIGHT CHANCE OF DOZING
HOW LIKELY ARE YOU TO NOD OFF OR FALL ASLEEP WHILE WATCHING TV: SLIGHT CHANCE OF DOZING

## 2022-09-13 ASSESSMENT — PAIN SCALES - GENERAL: PAINLEVEL: NO PAIN (0)

## 2022-09-13 NOTE — PROGRESS NOTES
Sleep Follow-Up Visit:    Date on this visit: 9/13/2022    Marcus Valdez comes in today for follow-up of his sleep study done on 9/6/22. Marcus Valdez was initially seen for snoring, observed apnea, sleepiness.     WatchPAT Results:  Weight: 191 pounds  Total Recording Time: 6 hrs, 10 min  Total Sleep Time: 4 hrs, 36 min  % of Sleep Time REM: 30.8%     Respiratory:  Snoring: Snoring was present.  Respiratory events: The PAT respiratory disturbance index [pRDI] was 9.8 events per hour.  The PAT apnea/hypopnea index [pAHI] was 4.6 events per hour.  IDANIA was 3.9 events per hour.  During REM sleep the pAHI was 11.4.  Sleep Associated Hypoxemia: sustained hypoxemia was not present. Mean oxygen saturation was 94%.  Minimum was 87%.  Time with saturation less than 88% was 0.2 minutes.     Heart Rate: By pulse oximetry normal rate was noted.      Position: Percent of time spent: supine - 66.3%, prone - 14.1%, on right - 13.8%, on left - 5.8%.  pAHI was 6.6 per hour supine, 0 per hour prone, 0 per hour on right side, and 3.8 per hour on left side.     He says this was a normal night for him. Whether he takes trazodone or not, he wakes multiple times per night.   I had prescribed doxepin, but he has not had time to pick that up and try it yet.     Past medical/surgical history, family history, social history, medications and allergies were reviewed.      Problem List:  Patient Active Problem List    Diagnosis Date Noted     Insomnia, unspecified type 10/07/2021     Priority: Medium     ALISSON (generalized anxiety disorder) 10/07/2021     Priority: Medium     Family history of diabetes mellitus 10/07/2021     Priority: Medium        Impression/Plan:    (F51.04) Chronic insomnia  (primary encounter diagnosis)  Comment: This study showed he only slept about 4.5 out of 6 hours in bed. He feels that is typical for him. He falls asleep ok, but wakes and then can't get back to sleep. 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 He used to have recurring nightmares, likely PTSD, but that has essentially resolved with therapy. Trazodone has not really helped him maintain sleep. I prescribed doxepin at his prior visit and he has not tried it yet.  Plan: He will try doxepin. We could add gabapentin on top of it depending on how it works. I recommend continued mental health management once in [place].    (G47.8) UARS (upper airway resistance syndrome)  Comment: This study showed a normal AHI of 4.6/hr, and mild UARS-RDI of 9.8/hr. He had a minor cluster of apnea in REM supine. I would have expected worse numbers based on reports of loud snoring and observed pauses in breathing, but he did not have other signs (HTN, BMI >35). This could certainly be an underestimation, but his O2 tracing looked quite normal and seemed to be a reliable reading.   Plan: Consider re-evaluation when in [place]. Could consider treating based on the mildly elevated RDI if desired.     He will follow up with me in the future as needed. We will communicate through [employer] [facility] for medication management until he can establish care in [place] (moving in November).     30 minutes were spent on the date of the encounter doing chart review, history and exam, documentation and further activities as noted above.     Bennett Goltz, PA-C    CC: No ref. provider found

## 2022-09-13 NOTE — PATIENT INSTRUCTIONS
When you get down to [place] and establish care with a sleep clinic, have them send a release of information form to our medical records department f: [phone]. They will then send your new clinic a copy of your notes and a copy of the sleep study.

## 2022-09-13 NOTE — PROGRESS NOTES
Marcus is a 37 year old who is being evaluated via a billable video visit.      How would you like to obtain your AVS? MyChart  If the video visit is dropped, the invitation should be resent by: Text to cell phone: [phone]  Will anyone else be joining your video visit? Arlette Walls      Video-Visit Details    Video Start Time: 3:30 PM    Type of service:  Video Visit    Video End Time:3:48 PM    Originating Location (pt. Location): Other car, in passenger seat    Distant Location (provider location):  [facility]     Platform used for Video Visit: [employer]

## 2022-10-26 DIAGNOSIS — F41.1 GAD (GENERALIZED ANXIETY DISORDER): ICD-10-CM

## 2022-10-26 RX ORDER — FLUOXETINE 10 MG/1
CAPSULE ORAL
Qty: 90 CAPSULE | Refills: 2 | Status: SHIPPED | OUTPATIENT
Start: 2022-10-26

## 2023-02-11 ENCOUNTER — HEALTH MAINTENANCE LETTER (OUTPATIENT)
Age: 38
End: 2023-02-11

## 2024-03-09 ENCOUNTER — HEALTH MAINTENANCE LETTER (OUTPATIENT)
Age: 39
End: 2024-03-09

## 2025-03-16 ENCOUNTER — HEALTH MAINTENANCE LETTER (OUTPATIENT)
Age: 40
End: 2025-03-16